# Patient Record
Sex: MALE | Race: WHITE | NOT HISPANIC OR LATINO | Employment: OTHER | URBAN - METROPOLITAN AREA
[De-identification: names, ages, dates, MRNs, and addresses within clinical notes are randomized per-mention and may not be internally consistent; named-entity substitution may affect disease eponyms.]

---

## 2021-12-14 ENCOUNTER — PRE-ADMISSION TESTING (OUTPATIENT)
Dept: PREADMISSION TESTING | Facility: HOSPITAL | Age: 61
End: 2021-12-14

## 2021-12-14 VITALS
WEIGHT: 225.6 LBS | RESPIRATION RATE: 16 BRPM | OXYGEN SATURATION: 99 % | TEMPERATURE: 98.8 F | HEART RATE: 77 BPM | DIASTOLIC BLOOD PRESSURE: 71 MMHG | SYSTOLIC BLOOD PRESSURE: 126 MMHG | HEIGHT: 67 IN | BODY MASS INDEX: 35.41 KG/M2

## 2021-12-14 LAB
ANION GAP SERPL CALCULATED.3IONS-SCNC: 12.2 MMOL/L (ref 5–15)
BUN SERPL-MCNC: 12 MG/DL (ref 8–23)
BUN/CREAT SERPL: 11.9 (ref 7–25)
CALCIUM SPEC-SCNC: 9.5 MG/DL (ref 8.6–10.5)
CHLORIDE SERPL-SCNC: 102 MMOL/L (ref 98–107)
CO2 SERPL-SCNC: 26.8 MMOL/L (ref 22–29)
CREAT SERPL-MCNC: 1.01 MG/DL (ref 0.76–1.27)
DEPRECATED RDW RBC AUTO: 45.2 FL (ref 37–54)
ERYTHROCYTE [DISTWIDTH] IN BLOOD BY AUTOMATED COUNT: 12.8 % (ref 12.3–15.4)
GFR SERPL CREATININE-BSD FRML MDRD: 75 ML/MIN/1.73
GLUCOSE SERPL-MCNC: 132 MG/DL (ref 65–99)
HCT VFR BLD AUTO: 43.7 % (ref 37.5–51)
HGB BLD-MCNC: 14.4 G/DL (ref 13–17.7)
MCH RBC QN AUTO: 31.6 PG (ref 26.6–33)
MCHC RBC AUTO-ENTMCNC: 33 G/DL (ref 31.5–35.7)
MCV RBC AUTO: 95.8 FL (ref 79–97)
PLATELET # BLD AUTO: 241 10*3/MM3 (ref 140–450)
PMV BLD AUTO: 10.2 FL (ref 6–12)
POTASSIUM SERPL-SCNC: 4 MMOL/L (ref 3.5–5.2)
QT INTERVAL: 405 MS
RBC # BLD AUTO: 4.56 10*6/MM3 (ref 4.14–5.8)
SODIUM SERPL-SCNC: 141 MMOL/L (ref 136–145)
WBC NRBC COR # BLD: 8.89 10*3/MM3 (ref 3.4–10.8)

## 2021-12-14 PROCEDURE — 80048 BASIC METABOLIC PNL TOTAL CA: CPT

## 2021-12-14 PROCEDURE — 36415 COLL VENOUS BLD VENIPUNCTURE: CPT

## 2021-12-14 PROCEDURE — 85027 COMPLETE CBC AUTOMATED: CPT

## 2021-12-14 PROCEDURE — 93005 ELECTROCARDIOGRAM TRACING: CPT

## 2021-12-14 PROCEDURE — 93010 ELECTROCARDIOGRAM REPORT: CPT | Performed by: INTERNAL MEDICINE

## 2021-12-14 RX ORDER — ATORVASTATIN CALCIUM 80 MG/1
80 TABLET, FILM COATED ORAL DAILY
COMMUNITY

## 2021-12-14 RX ORDER — AMLODIPINE BESYLATE 5 MG/1
TABLET ORAL DAILY
COMMUNITY

## 2021-12-14 RX ORDER — CHLORAL HYDRATE 500 MG
1000 CAPSULE ORAL DAILY
COMMUNITY
End: 2021-12-21 | Stop reason: HOSPADM

## 2021-12-14 RX ORDER — LATANOPROST 50 UG/ML
1 SOLUTION/ DROPS OPHTHALMIC
COMMUNITY

## 2021-12-14 RX ORDER — LEVETIRACETAM 750 MG/1
1500 TABLET ORAL 2 TIMES DAILY
COMMUNITY

## 2021-12-14 RX ORDER — MELOXICAM 15 MG/1
15 TABLET ORAL DAILY
COMMUNITY

## 2021-12-14 RX ORDER — ASPIRIN 81 MG/1
81 TABLET ORAL DAILY
COMMUNITY
End: 2021-12-21 | Stop reason: HOSPADM

## 2021-12-14 RX ORDER — PANTOPRAZOLE SODIUM 40 MG/1
40 TABLET, DELAYED RELEASE ORAL
COMMUNITY

## 2021-12-14 RX ORDER — LISINOPRIL 20 MG/1
20 TABLET ORAL DAILY
COMMUNITY

## 2021-12-14 RX ORDER — GABAPENTIN 400 MG/1
1200 CAPSULE ORAL 3 TIMES DAILY
COMMUNITY

## 2021-12-14 RX ORDER — BUDESONIDE AND FORMOTEROL FUMARATE DIHYDRATE 160; 4.5 UG/1; UG/1
2 AEROSOL RESPIRATORY (INHALATION)
COMMUNITY

## 2021-12-14 RX ORDER — CYCLOBENZAPRINE HCL 10 MG
10 TABLET ORAL 2 TIMES DAILY PRN
COMMUNITY

## 2021-12-14 NOTE — DISCHARGE INSTRUCTIONS
Take the following medications the morning of surgery: AMLODIPINE, GABAPENTIN, KEPPRA, PROTONIX    ARRIVAL TIME : 530AM    CUT OFF: 430AM    General Instructions:  • Do not eat solid food after midnight the night before surgery.  • You may drink clear liquids day of surgery but must stop at least one hour before your hospital arrival time.  • It is beneficial for you to have a clear drink that contains carbohydrates the day of surgery.  We suggest a 12 to 20 ounce bottle of Gatorade or Powerade for non-diabetic patients or a 12 to 20 ounce bottle of G2 or Powerade Zero for diabetic patients. (Pediatric patients, are not advised to drink a 12 to 20 ounce carbohydrate drink)    Clear liquids are liquids you can see through.  Nothing red in color.     Plain water                               Sports drinks  Sodas                                   Gelatin (Jell-O)  Fruit juices without pulp such as white grape juice and apple juice  Popsicles that contain no fruit or yogurt  Tea or coffee (no cream or milk added)  Gatorade / Powerade  G2 / Powerade Zero    • Patients who avoid smoking, chewing tobacco and alcohol for 4 weeks prior to surgery have a reduced risk of post-operative complications.  Quit smoking as many days before surgery as you can.  • Do not smoke, use chewing tobacco or drink alcohol the day of surgery.   • Bring any papers given to you in the doctor’s office.  • Wear clean comfortable clothes.  • Do not wear contact lenses, false eyelashes or make-up.  Bring a case for your glasses.   • Bring crutches or walker if applicable.  • Remove all piercings.  Leave jewelry and any other valuables at home.  • Hair extensions with metal clips must be removed prior to surgery.  • The Pre-Admission Testing nurse will instruct you to bring medications if unable to obtain an accurate list in Pre-Admission Testing.        Preventing a Surgical Site Infection:  • For 2 to 3 days before surgery, avoid shaving with a  razor because the razor can irritate skin and make it easier to develop an infection.    • Any areas of open skin can increase the risk of a post-operative wound infection by allowing bacteria to enter and travel throughout the body.  Notify your surgeon if you have any skin wounds / rashes even if it is not near the expected surgical site.  The area will need assessed to determine if surgery should be delayed until it is healed.  • The night prior to surgery shower using a fresh bar of anti-bacterial soap (such as Dial) and clean washcloth.  Sleep in a clean bed with clean clothing.  Do not allow pets to sleep with you.  • Shower on the morning of surgery using a fresh bar of anti-bacterial soap (such as Dial) and clean washcloth.  Dry with a clean towel and dress in clean clothing.  • Ask your surgeon if you will be receiving antibiotics prior to surgery.  • Make sure you, your family, and all healthcare providers clean their hands with soap and water or an alcohol based hand  before caring for you or your wound.    Day of surgery:  Your arrival time is approximately two hours before your scheduled surgery time.  Upon arrival, a Pre-op nurse and Anesthesiologist will review your health history, obtain vital signs, and answer questions you may have.  The only belongings needed at this time will be a list of your home medications and if applicable your C-PAP/BI-PAP machine.  A Pre-op nurse will start an IV and you may receive medication in preparation for surgery, including something to help you relax.     Please be aware that surgery does come with discomfort.  We want to make every effort to control your discomfort so please discuss any uncontrolled symptoms with your nurse.   Your doctor will most likely have prescribed pain medications.      If you are going home after surgery you will receive individualized written care instructions before being discharged.  A responsible adult must drive you to and  from the hospital on the day of your surgery and stay with you for 24 hours.  Discharge prescriptions can be filled by the hospital pharmacy during regular pharmacy hours.  If you are having surgery late in the day/evening your prescription may be e-prescribed to your pharmacy.  Please verify your pharmacy hours or chose a 24 hour pharmacy to avoid not having access to your prescription because your pharmacy has closed for the day.    If you are staying overnight following surgery, you will be transported to your hospital room following the recovery period.  [facility] has all private rooms.    If you have any questions please call Pre-Admission Testing at [phone].  Deductibles and co-payments are collected on the day of service. Please be prepared to pay the required co-pay, deductible or deposit on the day of service as defined by your plan.    Patient Education for Self-Quarantine Process    • Following your COVID testing, we strongly recommend that you wear a mask when you are with other people and practice social distancing.   • Limit your activities to only required outings.  • Wash your hands with soap and water frequently for at least 20 seconds.   • Avoid touching your eyes, nose and mouth with unwashed hands.  • Do not share anything - utensils, drinking glasses, food from the same bowl.   • Sanitize household surfaces daily. Include all high touch areas (door handles, light switches, phones, countertops, etc.)    Call your surgeon immediately if you experience any of the following symptoms:  • Sore Throat  • Shortness of Breath or difficulty breathing  • Cough  • Chills  • Body soreness or muscle pain  • Headache  • Fever  • New loss of taste or smell  • Do not arrive for your surgery ill.  Your procedure will need to be rescheduled to another time.  You will need to call your physician before the day of surgery to avoid any unnecessary exposure to hospital staff as well as other  patients.

## 2021-12-18 ENCOUNTER — LAB (OUTPATIENT)
Dept: LAB | Facility: HOSPITAL | Age: 61
End: 2021-12-18

## 2021-12-18 LAB — SARS-COV-2 ORF1AB RESP QL NAA+PROBE: NOT DETECTED

## 2021-12-18 PROCEDURE — C9803 HOPD COVID-19 SPEC COLLECT: HCPCS

## 2021-12-18 PROCEDURE — U0004 COV-19 TEST NON-CDC HGH THRU: HCPCS

## 2021-12-20 ENCOUNTER — ANESTHESIA EVENT (OUTPATIENT)
Dept: PERIOP | Facility: HOSPITAL | Age: 61
End: 2021-12-20

## 2021-12-21 ENCOUNTER — HOSPITAL ENCOUNTER (OUTPATIENT)
Facility: HOSPITAL | Age: 61
Setting detail: HOSPITAL OUTPATIENT SURGERY
Discharge: HOME | End: 2021-12-21
Attending: OPHTHALMOLOGY | Admitting: OPHTHALMOLOGY

## 2021-12-21 ENCOUNTER — ANESTHESIA (OUTPATIENT)
Dept: PERIOP | Facility: HOSPITAL | Age: 61
End: 2021-12-21

## 2021-12-21 VITALS
SYSTOLIC BLOOD PRESSURE: 81 MMHG | DIASTOLIC BLOOD PRESSURE: 57 MMHG | RESPIRATION RATE: 16 BRPM | OXYGEN SATURATION: 92 % | TEMPERATURE: 99.1 F | HEART RATE: 60 BPM

## 2021-12-21 DIAGNOSIS — C44.91 BASAL CELL CARCINOMA: ICD-10-CM

## 2021-12-21 PROCEDURE — 25010000002 ONDANSETRON PER 1 MG: Performed by: NURSE ANESTHETIST, CERTIFIED REGISTERED

## 2021-12-21 PROCEDURE — 25010000002 MIDAZOLAM PER 1 MG: Performed by: STUDENT IN AN ORGANIZED HEALTH CARE EDUCATION/TRAINING PROGRAM

## 2021-12-21 PROCEDURE — 25010000002 FENTANYL CITRATE (PF) 50 MCG/ML SOLUTION: Performed by: NURSE ANESTHETIST, CERTIFIED REGISTERED

## 2021-12-21 PROCEDURE — 25010000002 PROPOFOL 10 MG/ML EMULSION: Performed by: NURSE ANESTHETIST, CERTIFIED REGISTERED

## 2021-12-21 PROCEDURE — 25010000002 PHENYLEPHRINE 10 MG/ML SOLUTION: Performed by: NURSE ANESTHETIST, CERTIFIED REGISTERED

## 2021-12-21 PROCEDURE — 94799 UNLISTED PULMONARY SVC/PX: CPT

## 2021-12-21 PROCEDURE — 88331 PATH CONSLTJ SURG 1 BLK 1SPC: CPT | Performed by: OPHTHALMOLOGY

## 2021-12-21 PROCEDURE — 88305 TISSUE EXAM BY PATHOLOGIST: CPT | Performed by: OPHTHALMOLOGY

## 2021-12-21 RX ORDER — MIDAZOLAM HYDROCHLORIDE 1 MG/ML
1 INJECTION INTRAMUSCULAR; INTRAVENOUS
Status: DISCONTINUED | OUTPATIENT
Start: 2021-12-21 | End: 2021-12-21 | Stop reason: HOSPADM

## 2021-12-21 RX ORDER — MAGNESIUM HYDROXIDE 1200 MG/15ML
LIQUID ORAL AS NEEDED
Status: DISCONTINUED | OUTPATIENT
Start: 2021-12-21 | End: 2021-12-21 | Stop reason: HOSPADM

## 2021-12-21 RX ORDER — PROMETHAZINE HYDROCHLORIDE 25 MG/1
25 TABLET ORAL ONCE AS NEEDED
Status: DISCONTINUED | OUTPATIENT
Start: 2021-12-21 | End: 2021-12-21 | Stop reason: HOSPADM

## 2021-12-21 RX ORDER — LIDOCAINE HYDROCHLORIDE 10 MG/ML
0.5 INJECTION, SOLUTION EPIDURAL; INFILTRATION; INTRACAUDAL; PERINEURAL ONCE AS NEEDED
Status: COMPLETED | OUTPATIENT
Start: 2021-12-21 | End: 2021-12-21

## 2021-12-21 RX ORDER — OXYCODONE AND ACETAMINOPHEN 7.5; 325 MG/1; MG/1
1 TABLET ORAL EVERY 4 HOURS PRN
Status: DISCONTINUED | OUTPATIENT
Start: 2021-12-21 | End: 2021-12-21 | Stop reason: HOSPADM

## 2021-12-21 RX ORDER — HYDRALAZINE HYDROCHLORIDE 20 MG/ML
5 INJECTION INTRAMUSCULAR; INTRAVENOUS
Status: DISCONTINUED | OUTPATIENT
Start: 2021-12-21 | End: 2021-12-21 | Stop reason: HOSPADM

## 2021-12-21 RX ORDER — PHENYLEPHRINE HYDROCHLORIDE 10 MG/ML
INJECTION INTRAVENOUS AS NEEDED
Status: DISCONTINUED | OUTPATIENT
Start: 2021-12-21 | End: 2021-12-21 | Stop reason: SURG

## 2021-12-21 RX ORDER — SODIUM CHLORIDE 0.9 % (FLUSH) 0.9 %
3-10 SYRINGE (ML) INJECTION AS NEEDED
Status: DISCONTINUED | OUTPATIENT
Start: 2021-12-21 | End: 2021-12-21 | Stop reason: HOSPADM

## 2021-12-21 RX ORDER — ERYTHROMYCIN 5 MG/G
OINTMENT OPHTHALMIC
Qty: 3.5 G | Refills: 1 | Status: ON HOLD | OUTPATIENT
Start: 2021-12-21 | End: 2022-04-28

## 2021-12-21 RX ORDER — HYDROCODONE BITARTRATE AND ACETAMINOPHEN 5; 325 MG/1; MG/1
1 TABLET ORAL EVERY 6 HOURS PRN
Qty: 15 TABLET | Refills: 0 | Status: ON HOLD | OUTPATIENT
Start: 2021-12-21 | End: 2022-04-28

## 2021-12-21 RX ORDER — DIPHENHYDRAMINE HCL 25 MG
25 CAPSULE ORAL
Status: DISCONTINUED | OUTPATIENT
Start: 2021-12-21 | End: 2021-12-21 | Stop reason: HOSPADM

## 2021-12-21 RX ORDER — ACETAMINOPHEN 650 MG/1
650 SUPPOSITORY RECTAL ONCE AS NEEDED
Status: DISCONTINUED | OUTPATIENT
Start: 2021-12-21 | End: 2021-12-21 | Stop reason: HOSPADM

## 2021-12-21 RX ORDER — SODIUM CHLORIDE 0.9 % (FLUSH) 0.9 %
3 SYRINGE (ML) INJECTION EVERY 12 HOURS SCHEDULED
Status: DISCONTINUED | OUTPATIENT
Start: 2021-12-21 | End: 2021-12-21 | Stop reason: HOSPADM

## 2021-12-21 RX ORDER — EPHEDRINE SULFATE 50 MG/ML
INJECTION, SOLUTION INTRAVENOUS AS NEEDED
Status: DISCONTINUED | OUTPATIENT
Start: 2021-12-21 | End: 2021-12-21 | Stop reason: SURG

## 2021-12-21 RX ORDER — HYDROMORPHONE HYDROCHLORIDE 1 MG/ML
0.5 INJECTION, SOLUTION INTRAMUSCULAR; INTRAVENOUS; SUBCUTANEOUS
Status: DISCONTINUED | OUTPATIENT
Start: 2021-12-21 | End: 2021-12-21 | Stop reason: HOSPADM

## 2021-12-21 RX ORDER — OXYMETAZOLINE HYDROCHLORIDE 0.05 G/100ML
SPRAY NASAL AS NEEDED
Status: DISCONTINUED | OUTPATIENT
Start: 2021-12-21 | End: 2021-12-21 | Stop reason: HOSPADM

## 2021-12-21 RX ORDER — HYDROCODONE BITARTRATE AND ACETAMINOPHEN 7.5; 325 MG/1; MG/1
1 TABLET ORAL ONCE AS NEEDED
Status: COMPLETED | OUTPATIENT
Start: 2021-12-21 | End: 2021-12-21

## 2021-12-21 RX ORDER — ACETAMINOPHEN 325 MG/1
650 TABLET ORAL ONCE AS NEEDED
Status: DISCONTINUED | OUTPATIENT
Start: 2021-12-21 | End: 2021-12-21 | Stop reason: HOSPADM

## 2021-12-21 RX ORDER — FENTANYL CITRATE 50 UG/ML
INJECTION, SOLUTION INTRAMUSCULAR; INTRAVENOUS AS NEEDED
Status: DISCONTINUED | OUTPATIENT
Start: 2021-12-21 | End: 2021-12-21 | Stop reason: SURG

## 2021-12-21 RX ORDER — ERYTHROMYCIN 5 MG/G
OINTMENT OPHTHALMIC AS NEEDED
Status: DISCONTINUED | OUTPATIENT
Start: 2021-12-21 | End: 2021-12-21 | Stop reason: HOSPADM

## 2021-12-21 RX ORDER — NALOXONE HCL 0.4 MG/ML
0.2 VIAL (ML) INJECTION AS NEEDED
Status: DISCONTINUED | OUTPATIENT
Start: 2021-12-21 | End: 2021-12-21 | Stop reason: HOSPADM

## 2021-12-21 RX ORDER — PROPOFOL 10 MG/ML
VIAL (ML) INTRAVENOUS AS NEEDED
Status: DISCONTINUED | OUTPATIENT
Start: 2021-12-21 | End: 2021-12-21 | Stop reason: SURG

## 2021-12-21 RX ORDER — DIPHENHYDRAMINE HYDROCHLORIDE 50 MG/ML
12.5 INJECTION INTRAMUSCULAR; INTRAVENOUS
Status: DISCONTINUED | OUTPATIENT
Start: 2021-12-21 | End: 2021-12-21 | Stop reason: HOSPADM

## 2021-12-21 RX ORDER — LIDOCAINE HYDROCHLORIDE 20 MG/ML
INJECTION, SOLUTION INFILTRATION; PERINEURAL AS NEEDED
Status: DISCONTINUED | OUTPATIENT
Start: 2021-12-21 | End: 2021-12-21 | Stop reason: SURG

## 2021-12-21 RX ORDER — GLYCOPYRROLATE 0.2 MG/ML
INJECTION INTRAMUSCULAR; INTRAVENOUS AS NEEDED
Status: DISCONTINUED | OUTPATIENT
Start: 2021-12-21 | End: 2021-12-21 | Stop reason: SURG

## 2021-12-21 RX ORDER — PROMETHAZINE HYDROCHLORIDE 25 MG/1
25 SUPPOSITORY RECTAL ONCE AS NEEDED
Status: DISCONTINUED | OUTPATIENT
Start: 2021-12-21 | End: 2021-12-21 | Stop reason: HOSPADM

## 2021-12-21 RX ORDER — FLUMAZENIL 0.1 MG/ML
0.2 INJECTION INTRAVENOUS AS NEEDED
Status: DISCONTINUED | OUTPATIENT
Start: 2021-12-21 | End: 2021-12-21 | Stop reason: HOSPADM

## 2021-12-21 RX ORDER — ACETAMINOPHEN 500 MG
500 TABLET ORAL ONCE
Status: COMPLETED | OUTPATIENT
Start: 2021-12-21 | End: 2021-12-21

## 2021-12-21 RX ORDER — ONDANSETRON 2 MG/ML
4 INJECTION INTRAMUSCULAR; INTRAVENOUS ONCE AS NEEDED
Status: DISCONTINUED | OUTPATIENT
Start: 2021-12-21 | End: 2021-12-21 | Stop reason: HOSPADM

## 2021-12-21 RX ORDER — ONDANSETRON 2 MG/ML
INJECTION INTRAMUSCULAR; INTRAVENOUS AS NEEDED
Status: DISCONTINUED | OUTPATIENT
Start: 2021-12-21 | End: 2021-12-21 | Stop reason: SURG

## 2021-12-21 RX ORDER — LABETALOL HYDROCHLORIDE 5 MG/ML
5 INJECTION, SOLUTION INTRAVENOUS
Status: DISCONTINUED | OUTPATIENT
Start: 2021-12-21 | End: 2021-12-21 | Stop reason: HOSPADM

## 2021-12-21 RX ORDER — SODIUM CHLORIDE, SODIUM LACTATE, POTASSIUM CHLORIDE, CALCIUM CHLORIDE 600; 310; 30; 20 MG/100ML; MG/100ML; MG/100ML; MG/100ML
9 INJECTION, SOLUTION INTRAVENOUS CONTINUOUS
Status: DISCONTINUED | OUTPATIENT
Start: 2021-12-21 | End: 2021-12-21 | Stop reason: HOSPADM

## 2021-12-21 RX ORDER — EPHEDRINE SULFATE 50 MG/ML
5 INJECTION, SOLUTION INTRAVENOUS ONCE AS NEEDED
Status: DISCONTINUED | OUTPATIENT
Start: 2021-12-21 | End: 2021-12-21 | Stop reason: HOSPADM

## 2021-12-21 RX ORDER — FENTANYL CITRATE 50 UG/ML
50 INJECTION, SOLUTION INTRAMUSCULAR; INTRAVENOUS
Status: DISCONTINUED | OUTPATIENT
Start: 2021-12-21 | End: 2021-12-21 | Stop reason: HOSPADM

## 2021-12-21 RX ADMIN — LIDOCAINE HYDROCHLORIDE 100 MG: 20 INJECTION, SOLUTION INFILTRATION; PERINEURAL at 06:56

## 2021-12-21 RX ADMIN — SODIUM CHLORIDE, POTASSIUM CHLORIDE, SODIUM LACTATE AND CALCIUM CHLORIDE 9 ML/HR: 600; 310; 30; 20 INJECTION, SOLUTION INTRAVENOUS at 06:25

## 2021-12-21 RX ADMIN — LIDOCAINE HYDROCHLORIDE 0.5 ML: 10 INJECTION, SOLUTION EPIDURAL; INFILTRATION; INTRACAUDAL; PERINEURAL at 06:25

## 2021-12-21 RX ADMIN — FENTANYL CITRATE 25 MCG: 50 INJECTION INTRAMUSCULAR; INTRAVENOUS at 09:05

## 2021-12-21 RX ADMIN — PHENYLEPHRINE HYDROCHLORIDE 200 MCG: 10 INJECTION, SOLUTION INTRAVENOUS at 08:42

## 2021-12-21 RX ADMIN — PROPOFOL 180 MG: 10 INJECTION, EMULSION INTRAVENOUS at 06:56

## 2021-12-21 RX ADMIN — EPHEDRINE SULFATE 10 MG: 50 INJECTION INTRAVENOUS at 07:10

## 2021-12-21 RX ADMIN — PHENYLEPHRINE HYDROCHLORIDE 200 MCG: 10 INJECTION, SOLUTION INTRAVENOUS at 07:28

## 2021-12-21 RX ADMIN — FENTANYL CITRATE 25 MCG: 50 INJECTION INTRAMUSCULAR; INTRAVENOUS at 07:07

## 2021-12-21 RX ADMIN — ONDANSETRON 4 MG: 2 INJECTION INTRAMUSCULAR; INTRAVENOUS at 08:34

## 2021-12-21 RX ADMIN — PROPOFOL 20 MG: 10 INJECTION, EMULSION INTRAVENOUS at 07:01

## 2021-12-21 RX ADMIN — PHENYLEPHRINE HYDROCHLORIDE 100 MCG: 10 INJECTION, SOLUTION INTRAVENOUS at 07:33

## 2021-12-21 RX ADMIN — PHENYLEPHRINE HYDROCHLORIDE 200 MCG: 10 INJECTION, SOLUTION INTRAVENOUS at 07:18

## 2021-12-21 RX ADMIN — EPHEDRINE SULFATE 10 MG: 50 INJECTION INTRAVENOUS at 07:13

## 2021-12-21 RX ADMIN — EPHEDRINE SULFATE 5 MG: 50 INJECTION INTRAVENOUS at 07:28

## 2021-12-21 RX ADMIN — SODIUM CHLORIDE, POTASSIUM CHLORIDE, SODIUM LACTATE AND CALCIUM CHLORIDE: 600; 310; 30; 20 INJECTION, SOLUTION INTRAVENOUS at 08:53

## 2021-12-21 RX ADMIN — ACETAMINOPHEN 500 MG: 500 TABLET ORAL at 06:25

## 2021-12-21 RX ADMIN — HYDROCODONE BITARTRATE AND ACETAMINOPHEN 1 TABLET: 7.5; 325 TABLET ORAL at 09:38

## 2021-12-21 RX ADMIN — PHENYLEPHRINE HYDROCHLORIDE 200 MCG: 10 INJECTION, SOLUTION INTRAVENOUS at 07:25

## 2021-12-21 RX ADMIN — EPHEDRINE SULFATE 5 MG: 50 INJECTION INTRAVENOUS at 07:18

## 2021-12-21 RX ADMIN — PHENYLEPHRINE HYDROCHLORIDE 250 MCG: 10 INJECTION, SOLUTION INTRAVENOUS at 07:50

## 2021-12-21 RX ADMIN — PHENYLEPHRINE HYDROCHLORIDE 100 MCG: 10 INJECTION, SOLUTION INTRAVENOUS at 07:59

## 2021-12-21 RX ADMIN — PHENYLEPHRINE HYDROCHLORIDE 200 MCG: 10 INJECTION, SOLUTION INTRAVENOUS at 08:18

## 2021-12-21 RX ADMIN — EPHEDRINE SULFATE 5 MG: 50 INJECTION INTRAVENOUS at 07:25

## 2021-12-21 RX ADMIN — PHENYLEPHRINE HYDROCHLORIDE 100 MCG: 10 INJECTION, SOLUTION INTRAVENOUS at 08:03

## 2021-12-21 RX ADMIN — FENTANYL CITRATE 25 MCG: 50 INJECTION INTRAMUSCULAR; INTRAVENOUS at 07:33

## 2021-12-21 RX ADMIN — EPHEDRINE SULFATE 10 MG: 50 INJECTION INTRAVENOUS at 07:05

## 2021-12-21 RX ADMIN — FENTANYL CITRATE 25 MCG: 50 INJECTION INTRAMUSCULAR; INTRAVENOUS at 07:11

## 2021-12-21 RX ADMIN — PHENYLEPHRINE HYDROCHLORIDE 100 MCG: 10 INJECTION, SOLUTION INTRAVENOUS at 07:13

## 2021-12-21 RX ADMIN — PHENYLEPHRINE HYDROCHLORIDE 100 MCG: 10 INJECTION, SOLUTION INTRAVENOUS at 08:26

## 2021-12-21 RX ADMIN — GLYCOPYRROLATE 0.2 MG: 0.2 INJECTION INTRAMUSCULAR; INTRAVENOUS at 07:28

## 2021-12-21 RX ADMIN — PHENYLEPHRINE HYDROCHLORIDE 150 MCG: 10 INJECTION, SOLUTION INTRAVENOUS at 08:49

## 2021-12-21 RX ADMIN — MIDAZOLAM 1 MG: 1 INJECTION INTRAMUSCULAR; INTRAVENOUS at 06:25

## 2021-12-21 RX ADMIN — PHENYLEPHRINE HYDROCHLORIDE 100 MCG: 10 INJECTION, SOLUTION INTRAVENOUS at 08:55

## 2021-12-21 RX ADMIN — PHENYLEPHRINE HYDROCHLORIDE 200 MCG: 10 INJECTION, SOLUTION INTRAVENOUS at 07:42

## 2021-12-21 RX ADMIN — FENTANYL CITRATE 50 MCG: 50 INJECTION INTRAMUSCULAR; INTRAVENOUS at 09:37

## 2021-12-21 RX ADMIN — PHENYLEPHRINE HYDROCHLORIDE 100 MCG: 10 INJECTION, SOLUTION INTRAVENOUS at 08:06

## 2021-12-21 NOTE — ANESTHESIA PREPROCEDURE EVALUATION
Anesthesia Evaluation     Patient summary reviewed and Nursing notes reviewed   no history of anesthetic complications:  NPO Solid Status: > 8 hours  NPO Liquid Status: > 2 hours           Airway   Mallampati: II  TM distance: >3 FB  Neck ROM: full  Dental    (+) upper dentures    Pulmonary    (+) a smoker Current, COPD,   Cardiovascular     ECG reviewed    (+) hypertension, CAD, cardiac stents hyperlipidemia,       Neuro/Psych  (+) seizures, CVA,       ROS Comment: Hx of TBI  GI/Hepatic/Renal/Endo    (+) obesity,  GERD,      Musculoskeletal     Abdominal    Substance History      OB/GYN          Other                      Anesthesia Plan    ASA 3     general     intravenous induction     Anesthetic plan, all risks, benefits, and alternatives have been provided, discussed and informed consent has been obtained with: patient.

## 2021-12-21 NOTE — ANESTHESIA PROCEDURE NOTES
Airway  Urgency: elective    Date/Time: 12/21/2021 6:57 AM    General Information and Staff    Patient location during procedure: OR  Anesthesiologist: Celestine Teran MD  CRNA: Lisa Teague CRNA    Indications and Patient Condition  Indications for airway management: airway protection    Preoxygenated: yes  Mask difficulty assessment: 1 - vent by mask    Final Airway Details  Final airway type: supraglottic airway      Successful airway: unique  Size 5    Number of attempts at approach: 1  Assessment: lips, teeth, and gum same as pre-op and atraumatic intubation

## 2021-12-21 NOTE — OP NOTE
OPERATIVE NOTE    Patient Identification:  Name: Man Joseph  Age: 61 y.o.  Sex: male  :  1960  MRN: [id number]                                                 Preoperative diagnosis: Right medial canthal basal cell carcinoma  Postoperative diagnosis: same  Procedure: Excision of right  Basal cell carcinoma at medial canthus with frozen sections and repair with rotational flap, and probing of canalicular system on the right.   Surgeon: Joshua Clemens MD who was present and scrubbed throughout all critical portions of the operation  Assistants: Jodie Veloz MD ; Srinivas Lerner MD   Anesthesia: General  EBL: less than 50cc  Specimens:   ID Type Source Tests Collected by Time   A : RIGHT MEDIAL CANTHUS LESION WHITE AT 1200 AND BLACK  FROZEN CALL TO 4273 Tissue Eye, Right TISSUE PATHOLOGY EXAM Joshua Clemens MD 2021 0720   B : RIGHT MEDIAL CANTHUS LESION AT NEW EDGE BLACK SUTURE AT 1200 FROZEN CALL TO 4273 Tissue Eye, Right TISSUE PATHOLOGY EXAM Joshua Clemens MD 2021 0752          Description of the procedure:   The patient was taken to the operating room and placed on the table in the supine position, where anesthesia was induced. 2% lidocaine with epinephrine and 0.5% marcaine in a 1:1 fashion was injected over the surgical site, and the patient was prepped and draped in the usual manner for orbitofacial surgery.      Corneal protectors were placed in both eyes.      The right medial canthal lesion which was found to be ~1.2x1.3cm was excised with a #15 Bard Malcolm blade with a 1-2mm margin. The lesion was marked with two sutures at 6 and 12 oclock to orient the lesion. It was sent for frozen section to the pathology lab. The frozen section returned as basal cell carcinoma with positive 12 o'clock margin. An additional 2mm crescent at the superior margin was excised and oriented with a marking stitch and sent for frozen section. Margins were negative.     The  resulting defect measured 2x1.6cm. The lacrimal system was probed with Estrada probes to assess for involvement of the canaliculi. The probe could not be visualized and the system was determined to be intact.     To repair the  defect, a gabellar rotational flap was incised with a 15 blade. The flap was undermined with iris scissors, and rotated inferiorly so that the glabellar triangle filled the entirety of the defect.  The flap was trimmed to the appropriate size and  the central portion of the flap was sutured to the underlying tissue bed with buried 4-0 vicryl suture.   The remainder of the flap was sutured in place with 5-0 vicryl suture deeply and 5-0 fast absorbing suture superficially. A relaxing incision was made on the right upper lid to allow re approximation of the glabellar defect.  The glabellar defect was closed with deep 5-0 Vicryl sutures in a buried fashion. The skin of the glabella was closed with 5-0 Prolene sutures, first in a vertical mattress fashion to provide wound eversion, followed by an overlying running stitch.  The lid crease incision was closed with 5-0 fast-absorbing gut suture.       The corneal protectors were removed and antibiotic ophthalmic ointment was placed over the surgical site.      The patient was then awakened and taken from the operating room in good condition, having tolerated the procedure well. There were no complications, and the estimated blood loss was less than 50 cc.

## 2021-12-21 NOTE — ADDENDUM NOTE
Addendum  created 12/21/21 1139 by Celestine Teran MD    Attestation recorded in Intraprocedure, Intraprocedure Attestations filed

## 2021-12-21 NOTE — ANESTHESIA POSTPROCEDURE EVALUATION
Patient: Man Joseph    Procedure Summary     Date: 12/21/21 Room / Location:  [facility]  /  [facility]    Anesthesia Start: 0651 Anesthesia Stop: 0915    Procedures:       RIGHT EXCISION OF BASAL CELL CARCINOMA WITH FROZEN SECTION (Right Eye)      RIGHT MEDIAL CANTHUS REPAIR WITH ROTATIONAL FLAP PROBING OF RIGHT TEAR DUCT (Right Head) Diagnosis:     Surgeons: Joshua Clemens MD Provider: Celestine Teran MD    Anesthesia Type: general ASA Status: 3          Anesthesia Type: general    Vitals  Vitals Value Taken Time   BP 89/63 12/21/21 1016   Temp 37.3 °C (99.1 °F) 12/21/21 1015   Pulse 65 12/21/21 1018   Resp 16 12/21/21 1015   SpO2 97 % 12/21/21 1018   Vitals shown include unvalidated device data.        Post Anesthesia Care and Evaluation    Patient location during evaluation: bedside  Patient participation: complete - patient participated  Level of consciousness: awake and alert  Pain management: adequate  Airway patency: patent  Anesthetic complications: No anesthetic complications    Cardiovascular status: acceptable  Respiratory status: acceptable  Hydration status: acceptable    Comments: BP (!) 89/63 (BP Location: Right arm, Patient Position: Lying)   Pulse 66   Temp 37.3 °C (99.1 °F) (Temporal)   Resp 16   SpO2 97%

## 2021-12-21 NOTE — H&P
" History & Physical       Patient: Man Joseph    Date of Admission: 12/21/2021  5:27 AM    YOB: 1960    Medical Record Number: [id number]      Chief Complaints: lesion of right medial canthus      History of Present Illness: 61 y.o. male presents with as above. No new meds/health problems since office visit      Allergies:   Allergies   Allergen Reactions   • Penicillins Rash       10 point review of systems negative, except pertaining to the HPI    Medications:   Home Medications:  No current facility-administered medications on file prior to encounter.     No current outpatient medications on file prior to encounter.     Current Medications:  Scheduled Meds:sodium chloride, 3 mL, Intravenous, Q12H      Continuous Infusions:lactated ringers, 9 mL/hr, Last Rate: 9 mL/hr (12/21/21 0651)      PRN Meds:.•  acetaminophen **OR** acetaminophen  •  diphenhydrAMINE  •  diphenhydrAMINE  •  ePHEDrine  •  fentanyl  •  flumazenil  •  hydrALAZINE  •  HYDROcodone-acetaminophen  •  HYDROmorphone  •  labetalol  •  midazolam  •  naloxone  •  ondansetron  •  oxyCODONE-acetaminophen  •  promethazine **OR** promethazine  •  sodium chloride    Past Medical History:   Diagnosis Date   • Aortic aneurysm ([facility])     midline    • Basal cell carcinoma     NOSE/RT EYE-DUCT   • Cancer ([facility])     RT KIDNEY   • Cardiac arrest ([facility])     15YO    • COPD (chronic obstructive pulmonary disease) ([facility])    • GERD (gastroesophageal reflux disease)    • History of chest pain    • Hyperlipidemia    • Hypertension    • MI (myocardial infarction) ([facility])    • PTSD (post-traumatic stress disorder)    • Seizure ([facility])    • Stroke ([facility])     TIA-\"MINOR\"   • TBI (traumatic brain injury) ([facility])     AGE 24   • Tinnitus         Past Surgical History:   Procedure Laterality Date   • APPENDECTOMY      15YO   • CARPAL TUNNEL RELEASE Bilateral    • CERVICAL FUSION      C4-C5-C6   • CORONARY ANGIOPLASTY WITH STENT PLACEMENT     • HERNIA REPAIR Left     x2   • " KIDNEY SURGERY Right     PARTIAL NEPHRECTOMY   • SHOULDER ARTHROSCOPY Right    • SINUS SURGERY     • SKIN CANCER EXCISION  2019    BRIDGE OF NOSE    • TONSILLECTOMY      ADNOIDS        Social History     Occupational History   • Not on file   Tobacco Use   • Smoking status: Current Every Day Smoker     Packs/day: 1.00     Types: Cigarettes   • Smokeless tobacco: Never Used   • Tobacco comment: SINCE 12 YO   Vaping Use   • Vaping Use: Never used   Substance and Sexual Activity   • Alcohol use: Yes     Comment: RARE   • Drug use: Never   • Sexual activity: Defer      Social History     Social History Narrative   • Not on file        Family History   Problem Relation Age of Onset   • Malig Hyperthermia Neg Hx            Physical Exam   Constitutional: Alert, cooperative, in no acute distress    Head: Normocephalic.   Eyes:   Right medial canthal lesion, >1cm, nodular with ulceration  Neck: Normal range of motion.   Cardiovascular: Normal rate.    Pulmonary/Chest: Effort normal.   Neurological: Alert.   Skin: Skin is warm.   Psychiatric: Normal mood and affect.       Assessment/Plan:  The patient voiced understanding of the risks, benefits, and alternative forms of treatment that were discussed and the patient consents to proceed with excision of right medial canthal basal cell carcinoma with frozen sections and repair with rotational flap, possible full thickness skin graft, possible repair of canaliculus and tear duct.       Jodie Veloz MD

## 2021-12-22 LAB
LAB AP CASE REPORT: NORMAL
Lab: NORMAL
PATH REPORT.FINAL DX SPEC: NORMAL
PATH REPORT.GROSS SPEC: NORMAL

## 2022-04-27 ENCOUNTER — APPOINTMENT (OUTPATIENT)
Dept: CT IMAGING | Facility: HOSPITAL | Age: 62
End: 2022-04-27

## 2022-04-27 ENCOUNTER — APPOINTMENT (OUTPATIENT)
Dept: GENERAL RADIOLOGY | Facility: HOSPITAL | Age: 62
End: 2022-04-27

## 2022-04-27 ENCOUNTER — HOSPITAL ENCOUNTER (OUTPATIENT)
Facility: HOSPITAL | Age: 62
Setting detail: OBSERVATION
Discharge: HOME | End: 2022-04-28
Attending: EMERGENCY MEDICINE | Admitting: HOSPITALIST

## 2022-04-27 DIAGNOSIS — I25.10 CORONARY ARTERY DISEASE INVOLVING NATIVE HEART, UNSPECIFIED VESSEL OR LESION TYPE, UNSPECIFIED WHETHER ANGINA PRESENT: ICD-10-CM

## 2022-04-27 DIAGNOSIS — R07.9 CHEST PAIN, UNSPECIFIED TYPE: ICD-10-CM

## 2022-04-27 DIAGNOSIS — R42 ORTHOSTATIC LIGHTHEADEDNESS: ICD-10-CM

## 2022-04-27 DIAGNOSIS — I95.9 HYPOTENSION, UNSPECIFIED HYPOTENSION TYPE: Primary | ICD-10-CM

## 2022-04-27 PROBLEM — E87.5 HYPERKALEMIA: Status: ACTIVE | Noted: 2022-04-27

## 2022-04-27 PROBLEM — C44.311 BASAL CELL CARCINOMA (BCC) OF SKIN OF NOSE: Chronic | Status: ACTIVE | Noted: 2022-04-27

## 2022-04-27 PROBLEM — J44.9 COPD (CHRONIC OBSTRUCTIVE PULMONARY DISEASE): Chronic | Status: ACTIVE | Noted: 2022-04-27

## 2022-04-27 PROBLEM — I10 ESSENTIAL HYPERTENSION: Chronic | Status: ACTIVE | Noted: 2022-04-27

## 2022-04-27 PROBLEM — I25.9 CHEST PAIN DUE TO MYOCARDIAL ISCHEMIA: Chronic | Status: ACTIVE | Noted: 2022-04-27

## 2022-04-27 PROBLEM — Z86.73 HISTORY OF CVA (CEREBROVASCULAR ACCIDENT): Chronic | Status: ACTIVE | Noted: 2022-04-27

## 2022-04-27 PROBLEM — E78.2 MIXED HYPERLIPIDEMIA: Chronic | Status: ACTIVE | Noted: 2022-04-27

## 2022-04-27 PROBLEM — I71.40 ABDOMINAL AORTIC ANEURYSM (AAA) WITHOUT RUPTURE: Chronic | Status: ACTIVE | Noted: 2022-04-27

## 2022-04-27 PROBLEM — I46.9 CARDIAC ARREST: Status: ACTIVE | Noted: 2022-04-27

## 2022-04-27 PROBLEM — K21.9 GERD WITHOUT ESOPHAGITIS: Chronic | Status: ACTIVE | Noted: 2022-04-27

## 2022-04-27 PROBLEM — R56.9 SEIZURE (HCC): Chronic | Status: ACTIVE | Noted: 2022-04-27

## 2022-04-27 PROBLEM — N17.9 AKI (ACUTE KIDNEY INJURY): Status: ACTIVE | Noted: 2022-04-27

## 2022-04-27 LAB
ALBUMIN SERPL-MCNC: 4.1 G/DL (ref 3.5–5.2)
ALBUMIN/GLOB SERPL: 1.2 G/DL
ALP SERPL-CCNC: 105 U/L (ref 39–117)
ALT SERPL W P-5'-P-CCNC: 54 U/L (ref 1–41)
ANION GAP SERPL CALCULATED.3IONS-SCNC: 15 MMOL/L (ref 5–15)
APTT PPP: 26.2 SECONDS (ref 61–76.5)
AST SERPL-CCNC: 42 U/L (ref 1–40)
BASOPHILS # BLD AUTO: 0.1 10*3/MM3 (ref 0–0.2)
BASOPHILS NFR BLD AUTO: 0.6 % (ref 0–1.5)
BILIRUB SERPL-MCNC: 0.7 MG/DL (ref 0–1.2)
BUN SERPL-MCNC: 20 MG/DL (ref 8–23)
BUN/CREAT SERPL: 11.4 (ref 7–25)
CALCIUM SPEC-SCNC: 9.5 MG/DL (ref 8.6–10.5)
CHLORIDE SERPL-SCNC: 101 MMOL/L (ref 98–107)
CHOLEST SERPL-MCNC: 116 MG/DL (ref 0–200)
CO2 SERPL-SCNC: 26 MMOL/L (ref 22–29)
CREAT SERPL-MCNC: 1.75 MG/DL (ref 0.76–1.27)
D DIMER PPP FEU-MCNC: 1.22 MG/L (FEU) (ref 0–0.59)
DEPRECATED RDW RBC AUTO: 44.2 FL (ref 37–54)
EGFRCR SERPLBLD CKD-EPI 2021: 43.7 ML/MIN/1.73
EOSINOPHIL # BLD AUTO: 0.6 10*3/MM3 (ref 0–0.4)
EOSINOPHIL NFR BLD AUTO: 5.1 % (ref 0.3–6.2)
ERYTHROCYTE [DISTWIDTH] IN BLOOD BY AUTOMATED COUNT: 13.8 % (ref 12.3–15.4)
GLOBULIN UR ELPH-MCNC: 3.3 GM/DL
GLUCOSE SERPL-MCNC: 110 MG/DL (ref 65–99)
HCT VFR BLD AUTO: 40.3 % (ref 37.5–51)
HDLC SERPL-MCNC: 27 MG/DL (ref 40–60)
HGB BLD-MCNC: 13.2 G/DL (ref 13–17.7)
INR PPP: 1.11 (ref 0.93–1.1)
LDLC SERPL CALC-MCNC: 58 MG/DL (ref 0–100)
LDLC/HDLC SERPL: 1.93 {RATIO}
LYMPHOCYTES # BLD AUTO: 3.4 10*3/MM3 (ref 0.7–3.1)
LYMPHOCYTES NFR BLD AUTO: 29.7 % (ref 19.6–45.3)
MCH RBC QN AUTO: 30.3 PG (ref 26.6–33)
MCHC RBC AUTO-ENTMCNC: 32.9 G/DL (ref 31.5–35.7)
MCV RBC AUTO: 92.3 FL (ref 79–97)
MONOCYTES # BLD AUTO: 1.1 10*3/MM3 (ref 0.1–0.9)
MONOCYTES NFR BLD AUTO: 9.9 % (ref 5–12)
NEUTROPHILS NFR BLD AUTO: 54.7 % (ref 42.7–76)
NEUTROPHILS NFR BLD AUTO: 6.2 10*3/MM3 (ref 1.7–7)
NRBC BLD AUTO-RTO: 0.2 /100 WBC (ref 0–0.2)
NT-PROBNP SERPL-MCNC: 114.8 PG/ML (ref 0–900)
PLATELET # BLD AUTO: 239 10*3/MM3 (ref 140–450)
PMV BLD AUTO: 8.5 FL (ref 6–12)
POTASSIUM SERPL-SCNC: 5.4 MMOL/L (ref 3.5–5.2)
PROT SERPL-MCNC: 7.4 G/DL (ref 6–8.5)
PROTHROMBIN TIME: 11.4 SECONDS (ref 9.6–11.7)
RBC # BLD AUTO: 4.36 10*6/MM3 (ref 4.14–5.8)
SARS-COV-2 RNA PNL SPEC NAA+PROBE: NOT DETECTED
SODIUM SERPL-SCNC: 142 MMOL/L (ref 136–145)
TRIGL SERPL-MCNC: 184 MG/DL (ref 0–150)
TROPONIN T SERPL-MCNC: <0.01 NG/ML (ref 0–0.03)
TSH SERPL DL<=0.05 MIU/L-ACNC: 1.6 UIU/ML (ref 0.27–4.2)
VLDLC SERPL-MCNC: 31 MG/DL (ref 5–40)
WBC NRBC COR # BLD: 11.3 10*3/MM3 (ref 3.4–10.8)

## 2022-04-27 PROCEDURE — 80053 COMPREHEN METABOLIC PANEL: CPT

## 2022-04-27 PROCEDURE — G0378 HOSPITAL OBSERVATION PER HR: HCPCS

## 2022-04-27 PROCEDURE — 84443 ASSAY THYROID STIM HORMONE: CPT | Performed by: NURSE PRACTITIONER

## 2022-04-27 PROCEDURE — 0 IOPAMIDOL PER 1 ML: Performed by: EMERGENCY MEDICINE

## 2022-04-27 PROCEDURE — 83880 ASSAY OF NATRIURETIC PEPTIDE: CPT

## 2022-04-27 PROCEDURE — 85379 FIBRIN DEGRADATION QUANT: CPT

## 2022-04-27 PROCEDURE — 99284 EMERGENCY DEPT VISIT MOD MDM: CPT

## 2022-04-27 PROCEDURE — 84484 ASSAY OF TROPONIN QUANT: CPT

## 2022-04-27 PROCEDURE — 83036 HEMOGLOBIN GLYCOSYLATED A1C: CPT | Performed by: NURSE PRACTITIONER

## 2022-04-27 PROCEDURE — 85025 COMPLETE CBC W/AUTO DIFF WBC: CPT

## 2022-04-27 PROCEDURE — 87635 SARS-COV-2 COVID-19 AMP PRB: CPT | Performed by: EMERGENCY MEDICINE

## 2022-04-27 PROCEDURE — 36415 COLL VENOUS BLD VENIPUNCTURE: CPT | Performed by: NURSE PRACTITIONER

## 2022-04-27 PROCEDURE — 71275 CT ANGIOGRAPHY CHEST: CPT

## 2022-04-27 PROCEDURE — C9803 HOPD COVID-19 SPEC COLLECT: HCPCS

## 2022-04-27 PROCEDURE — 85730 THROMBOPLASTIN TIME PARTIAL: CPT

## 2022-04-27 PROCEDURE — 80061 LIPID PANEL: CPT | Performed by: NURSE PRACTITIONER

## 2022-04-27 PROCEDURE — 25010000002 HEPARIN (PORCINE) PER 1000 UNITS: Performed by: NURSE PRACTITIONER

## 2022-04-27 PROCEDURE — 85610 PROTHROMBIN TIME: CPT

## 2022-04-27 PROCEDURE — 93005 ELECTROCARDIOGRAM TRACING: CPT

## 2022-04-27 PROCEDURE — 96360 HYDRATION IV INFUSION INIT: CPT

## 2022-04-27 PROCEDURE — 84484 ASSAY OF TROPONIN QUANT: CPT | Performed by: NURSE PRACTITIONER

## 2022-04-27 PROCEDURE — 71045 X-RAY EXAM CHEST 1 VIEW: CPT

## 2022-04-27 PROCEDURE — 99219 PR INITIAL OBSERVATION CARE/DAY 50 MINUTES: CPT | Performed by: NURSE PRACTITIONER

## 2022-04-27 RX ORDER — ACETAMINOPHEN 160 MG/5ML
650 SOLUTION ORAL EVERY 4 HOURS PRN
Status: DISCONTINUED | OUTPATIENT
Start: 2022-04-27 | End: 2022-04-27

## 2022-04-27 RX ORDER — ATORVASTATIN CALCIUM 40 MG/1
80 TABLET, FILM COATED ORAL NIGHTLY
Status: DISCONTINUED | OUTPATIENT
Start: 2022-04-27 | End: 2022-04-27

## 2022-04-27 RX ORDER — SODIUM CHLORIDE 0.9 % (FLUSH) 0.9 %
10 SYRINGE (ML) INJECTION AS NEEDED
Status: DISCONTINUED | OUTPATIENT
Start: 2022-04-27 | End: 2022-04-28 | Stop reason: HOSPADM

## 2022-04-27 RX ORDER — KETOROLAC TROMETHAMINE 15 MG/ML
15 INJECTION, SOLUTION INTRAMUSCULAR; INTRAVENOUS EVERY 6 HOURS PRN
Status: DISCONTINUED | OUTPATIENT
Start: 2022-04-27 | End: 2022-04-28 | Stop reason: HOSPADM

## 2022-04-27 RX ORDER — NICOTINE 21 MG/24HR
1 PATCH, TRANSDERMAL 24 HOURS TRANSDERMAL
Status: DISCONTINUED | OUTPATIENT
Start: 2022-04-28 | End: 2022-04-28 | Stop reason: HOSPADM

## 2022-04-27 RX ORDER — ONDANSETRON 2 MG/ML
4 INJECTION INTRAMUSCULAR; INTRAVENOUS EVERY 6 HOURS PRN
Status: DISCONTINUED | OUTPATIENT
Start: 2022-04-27 | End: 2022-04-28 | Stop reason: HOSPADM

## 2022-04-27 RX ORDER — IPRATROPIUM BROMIDE AND ALBUTEROL SULFATE 2.5; .5 MG/3ML; MG/3ML
3 SOLUTION RESPIRATORY (INHALATION) EVERY 6 HOURS PRN
Status: DISCONTINUED | OUTPATIENT
Start: 2022-04-27 | End: 2022-04-28 | Stop reason: HOSPADM

## 2022-04-27 RX ORDER — CHOLECALCIFEROL (VITAMIN D3) 125 MCG
5 CAPSULE ORAL NIGHTLY PRN
Status: DISCONTINUED | OUTPATIENT
Start: 2022-04-27 | End: 2022-04-28 | Stop reason: HOSPADM

## 2022-04-27 RX ORDER — ATORVASTATIN CALCIUM 40 MG/1
80 TABLET, FILM COATED ORAL DAILY
Status: DISCONTINUED | OUTPATIENT
Start: 2022-04-28 | End: 2022-04-28 | Stop reason: HOSPADM

## 2022-04-27 RX ORDER — ACETAMINOPHEN 325 MG/1
650 TABLET ORAL EVERY 4 HOURS PRN
Status: DISCONTINUED | OUTPATIENT
Start: 2022-04-27 | End: 2022-04-27

## 2022-04-27 RX ORDER — ONDANSETRON 4 MG/1
4 TABLET, FILM COATED ORAL EVERY 6 HOURS PRN
Status: DISCONTINUED | OUTPATIENT
Start: 2022-04-27 | End: 2022-04-28 | Stop reason: HOSPADM

## 2022-04-27 RX ORDER — ALUMINA, MAGNESIA, AND SIMETHICONE 2400; 2400; 240 MG/30ML; MG/30ML; MG/30ML
15 SUSPENSION ORAL EVERY 6 HOURS PRN
Status: DISCONTINUED | OUTPATIENT
Start: 2022-04-27 | End: 2022-04-28 | Stop reason: HOSPADM

## 2022-04-27 RX ORDER — HEPARIN SODIUM 5000 [USP'U]/ML
5000 INJECTION, SOLUTION INTRAVENOUS; SUBCUTANEOUS EVERY 12 HOURS SCHEDULED
Status: DISCONTINUED | OUTPATIENT
Start: 2022-04-27 | End: 2022-04-28 | Stop reason: HOSPADM

## 2022-04-27 RX ORDER — SODIUM CHLORIDE 9 MG/ML
100 INJECTION, SOLUTION INTRAVENOUS CONTINUOUS
Status: DISCONTINUED | OUTPATIENT
Start: 2022-04-27 | End: 2022-04-28 | Stop reason: HOSPADM

## 2022-04-27 RX ORDER — NITROGLYCERIN 0.4 MG/1
0.4 TABLET SUBLINGUAL
Status: DISCONTINUED | OUTPATIENT
Start: 2022-04-27 | End: 2022-04-28 | Stop reason: HOSPADM

## 2022-04-27 RX ORDER — ACETAMINOPHEN 650 MG/1
650 SUPPOSITORY RECTAL EVERY 4 HOURS PRN
Status: DISCONTINUED | OUTPATIENT
Start: 2022-04-27 | End: 2022-04-27

## 2022-04-27 RX ORDER — ASPIRIN 81 MG/1
324 TABLET, CHEWABLE ORAL ONCE
Status: COMPLETED | OUTPATIENT
Start: 2022-04-27 | End: 2022-04-27

## 2022-04-27 RX ADMIN — SODIUM CHLORIDE 1000 ML: 9 INJECTION, SOLUTION INTRAVENOUS at 14:53

## 2022-04-27 RX ADMIN — SODIUM CHLORIDE 100 ML/HR: 9 INJECTION, SOLUTION INTRAVENOUS at 21:59

## 2022-04-27 RX ADMIN — ASPIRIN 324 MG: 81 TABLET, CHEWABLE ORAL at 20:10

## 2022-04-27 RX ADMIN — LEVETIRACETAM 750 MG: 500 TABLET, FILM COATED ORAL at 21:59

## 2022-04-27 RX ADMIN — NICOTINE 1 PATCH: 21 PATCH, EXTENDED RELEASE TRANSDERMAL at 23:57

## 2022-04-27 RX ADMIN — IOPAMIDOL 78 ML: 755 INJECTION, SOLUTION INTRAVENOUS at 17:03

## 2022-04-28 ENCOUNTER — APPOINTMENT (OUTPATIENT)
Dept: NUCLEAR MEDICINE | Facility: HOSPITAL | Age: 62
End: 2022-04-28

## 2022-04-28 ENCOUNTER — APPOINTMENT (OUTPATIENT)
Dept: CARDIOLOGY | Facility: HOSPITAL | Age: 62
End: 2022-04-28

## 2022-04-28 VITALS
OXYGEN SATURATION: 95 % | HEIGHT: 68 IN | SYSTOLIC BLOOD PRESSURE: 143 MMHG | WEIGHT: 219 LBS | DIASTOLIC BLOOD PRESSURE: 91 MMHG | TEMPERATURE: 98.2 F | BODY MASS INDEX: 33.19 KG/M2 | HEART RATE: 58 BPM | RESPIRATION RATE: 18 BRPM

## 2022-04-28 LAB
ANION GAP SERPL CALCULATED.3IONS-SCNC: 11 MMOL/L (ref 5–15)
ANION GAP SERPL CALCULATED.3IONS-SCNC: 14 MMOL/L (ref 5–15)
BASOPHILS # BLD AUTO: 0.1 10*3/MM3 (ref 0–0.2)
BASOPHILS NFR BLD AUTO: 1 % (ref 0–1.5)
BH CV ECHO MEAS - ACS: 2.03 CM
BH CV ECHO MEAS - AO MAX PG: 5.1 MMHG
BH CV ECHO MEAS - AO MEAN PG: 2.9 MMHG
BH CV ECHO MEAS - AO ROOT DIAM: 3.2 CM
BH CV ECHO MEAS - AO V2 MAX: 112.5 CM/SEC
BH CV ECHO MEAS - AO V2 VTI: 26.5 CM
BH CV ECHO MEAS - AVA(I,D): 2.7 CM2
BH CV ECHO MEAS - EDV(CUBED): 114.2 ML
BH CV ECHO MEAS - ESV(CUBED): 42 ML
BH CV ECHO MEAS - FS: 28.4 %
BH CV ECHO MEAS - IVS/LVPW: 0.94 CM
BH CV ECHO MEAS - IVSD: 1.07 CM
BH CV ECHO MEAS - LA DIMENSION(2D): 4 CM
BH CV ECHO MEAS - LV MASS(C)D: 196.7 GRAMS
BH CV ECHO MEAS - LV MAX PG: 3.3 MMHG
BH CV ECHO MEAS - LV MEAN PG: 1.81 MMHG
BH CV ECHO MEAS - LV V1 MAX: 90.5 CM/SEC
BH CV ECHO MEAS - LV V1 VTI: 23.8 CM
BH CV ECHO MEAS - LVIDD: 4.9 CM
BH CV ECHO MEAS - LVIDS: 3.5 CM
BH CV ECHO MEAS - LVOT AREA: 3 CM2
BH CV ECHO MEAS - LVOT DIAM: 1.96 CM
BH CV ECHO MEAS - LVPWD: 1.13 CM
BH CV ECHO MEAS - MV A MAX VEL: 44.2 CM/SEC
BH CV ECHO MEAS - MV DEC SLOPE: 290.5 CM/SEC2
BH CV ECHO MEAS - MV DEC TIME: 0.23 MSEC
BH CV ECHO MEAS - MV E MAX VEL: 65.7 CM/SEC
BH CV ECHO MEAS - MV E/A: 1.49
BH CV ECHO MEAS - MV MAX PG: 2.8 MMHG
BH CV ECHO MEAS - MV MEAN PG: 1.03 MMHG
BH CV ECHO MEAS - MV V2 VTI: 30.1 CM
BH CV ECHO MEAS - MVA(VTI): 2.38 CM2
BH CV ECHO MEAS - PA V2 MAX: 99.4 CM/SEC
BH CV ECHO MEAS - RAP SYSTOLE: 8 MMHG
BH CV ECHO MEAS - RV MAX PG: 0.67 MMHG
BH CV ECHO MEAS - RV V1 MAX: 41.1 CM/SEC
BH CV ECHO MEAS - RV V1 VTI: 9.3 CM
BH CV ECHO MEAS - RVDD: 2.7 CM
BH CV ECHO MEAS - RVSP: 32.6 MMHG
BH CV ECHO MEAS - SV(LVOT): 71.7 ML
BH CV ECHO MEAS - TR MAX PG: 24.6 MMHG
BH CV ECHO MEAS - TR MAX VEL: 248.2 CM/SEC
BH CV NUCLEAR PRIOR STUDY: 3
BH CV REST NUCLEAR ISOTOPE DOSE: 7.9 MCI
BH CV STRESS BP STAGE 1: NORMAL
BH CV STRESS BP STAGE 2: NORMAL
BH CV STRESS COMMENTS STAGE 1: NORMAL
BH CV STRESS COMMENTS STAGE 2: NORMAL
BH CV STRESS DOSE REGADENOSON STAGE 1: 0.4
BH CV STRESS DURATION MIN STAGE 1: 0
BH CV STRESS DURATION MIN STAGE 2: 4
BH CV STRESS DURATION SEC STAGE 1: 10
BH CV STRESS DURATION SEC STAGE 2: 0
BH CV STRESS HR STAGE 1: 58
BH CV STRESS HR STAGE 2: 67
BH CV STRESS NUCLEAR ISOTOPE DOSE: 21.9 MCI
BH CV STRESS PROTOCOL 1: NORMAL
BH CV STRESS RECOVERY BP: NORMAL MMHG
BH CV STRESS RECOVERY HR: 67 BPM
BH CV STRESS STAGE 1: 1
BH CV STRESS STAGE 2: 2
BUN SERPL-MCNC: 16 MG/DL (ref 8–23)
BUN SERPL-MCNC: 17 MG/DL (ref 8–23)
BUN/CREAT SERPL: 12.6 (ref 7–25)
BUN/CREAT SERPL: 16.3 (ref 7–25)
CALCIUM SPEC-SCNC: 8.6 MG/DL (ref 8.6–10.5)
CALCIUM SPEC-SCNC: 8.8 MG/DL (ref 8.6–10.5)
CHLORIDE SERPL-SCNC: 103 MMOL/L (ref 98–107)
CHLORIDE SERPL-SCNC: 105 MMOL/L (ref 98–107)
CO2 SERPL-SCNC: 24 MMOL/L (ref 22–29)
CO2 SERPL-SCNC: 24 MMOL/L (ref 22–29)
CREAT SERPL-MCNC: 0.98 MG/DL (ref 0.76–1.27)
CREAT SERPL-MCNC: 1.35 MG/DL (ref 0.76–1.27)
DEPRECATED RDW RBC AUTO: 45.1 FL (ref 37–54)
EGFRCR SERPLBLD CKD-EPI 2021: 59.7 ML/MIN/1.73
EGFRCR SERPLBLD CKD-EPI 2021: 87.7 ML/MIN/1.73
EOSINOPHIL # BLD AUTO: 0.4 10*3/MM3 (ref 0–0.4)
EOSINOPHIL NFR BLD AUTO: 5.5 % (ref 0.3–6.2)
ERYTHROCYTE [DISTWIDTH] IN BLOOD BY AUTOMATED COUNT: 14 % (ref 12.3–15.4)
GLUCOSE SERPL-MCNC: 100 MG/DL (ref 65–99)
GLUCOSE SERPL-MCNC: 112 MG/DL (ref 65–99)
HBA1C MFR BLD: 6.8 % (ref 3.5–5.6)
HCT VFR BLD AUTO: 39.7 % (ref 37.5–51)
HGB BLD-MCNC: 13.1 G/DL (ref 13–17.7)
LV EF 2D ECHO EST: 60 %
LYMPHOCYTES # BLD AUTO: 3 10*3/MM3 (ref 0.7–3.1)
LYMPHOCYTES NFR BLD AUTO: 40.2 % (ref 19.6–45.3)
MAXIMAL PREDICTED HEART RATE: 159 BPM
MAXIMAL PREDICTED HEART RATE: 159 BPM
MCH RBC QN AUTO: 30.5 PG (ref 26.6–33)
MCHC RBC AUTO-ENTMCNC: 33 G/DL (ref 31.5–35.7)
MCV RBC AUTO: 92.4 FL (ref 79–97)
MONOCYTES # BLD AUTO: 0.7 10*3/MM3 (ref 0.1–0.9)
MONOCYTES NFR BLD AUTO: 9.4 % (ref 5–12)
NEUTROPHILS NFR BLD AUTO: 3.2 10*3/MM3 (ref 1.7–7)
NEUTROPHILS NFR BLD AUTO: 43.9 % (ref 42.7–76)
NRBC BLD AUTO-RTO: 0.1 /100 WBC (ref 0–0.2)
PERCENT MAX PREDICTED HR: 43.4 %
PLATELET # BLD AUTO: 227 10*3/MM3 (ref 140–450)
PMV BLD AUTO: 8.6 FL (ref 6–12)
POTASSIUM SERPL-SCNC: 3.9 MMOL/L (ref 3.5–5.2)
POTASSIUM SERPL-SCNC: 4.2 MMOL/L (ref 3.5–5.2)
RBC # BLD AUTO: 4.29 10*6/MM3 (ref 4.14–5.8)
SODIUM SERPL-SCNC: 140 MMOL/L (ref 136–145)
SODIUM SERPL-SCNC: 141 MMOL/L (ref 136–145)
STRESS BASELINE BP: NORMAL MMHG
STRESS BASELINE HR: 58 BPM
STRESS PERCENT HR: 51 %
STRESS POST PEAK BP: NORMAL MMHG
STRESS POST PEAK HR: 69 BPM
STRESS TARGET HR: 135 BPM
STRESS TARGET HR: 135 BPM
WBC NRBC COR # BLD: 7.4 10*3/MM3 (ref 3.4–10.8)

## 2022-04-28 PROCEDURE — 25010000002 HEPARIN (PORCINE) PER 1000 UNITS: Performed by: NURSE PRACTITIONER

## 2022-04-28 PROCEDURE — G0378 HOSPITAL OBSERVATION PER HR: HCPCS

## 2022-04-28 PROCEDURE — 78452 HT MUSCLE IMAGE SPECT MULT: CPT | Performed by: INTERNAL MEDICINE

## 2022-04-28 PROCEDURE — 93306 TTE W/DOPPLER COMPLETE: CPT

## 2022-04-28 PROCEDURE — 0 TECHNETIUM TETROFOSMIN KIT: Performed by: HOSPITALIST

## 2022-04-28 PROCEDURE — 80048 BASIC METABOLIC PNL TOTAL CA: CPT | Performed by: NURSE PRACTITIONER

## 2022-04-28 PROCEDURE — 96361 HYDRATE IV INFUSION ADD-ON: CPT

## 2022-04-28 PROCEDURE — 25010000002 SULFUR HEXAFLUORIDE MICROSPH 60.7-25 MG RECONSTITUTED SUSPENSION: Performed by: HOSPITALIST

## 2022-04-28 PROCEDURE — 99217 PR OBSERVATION CARE DISCHARGE MANAGEMENT: CPT | Performed by: HOSPITALIST

## 2022-04-28 PROCEDURE — 25010000002 REGADENOSON 0.4 MG/5ML SOLUTION: Performed by: HOSPITALIST

## 2022-04-28 PROCEDURE — 93018 CV STRESS TEST I&R ONLY: CPT | Performed by: INTERNAL MEDICINE

## 2022-04-28 PROCEDURE — A9502 TC99M TETROFOSMIN: HCPCS | Performed by: HOSPITALIST

## 2022-04-28 PROCEDURE — 78452 HT MUSCLE IMAGE SPECT MULT: CPT

## 2022-04-28 PROCEDURE — 85025 COMPLETE CBC W/AUTO DIFF WBC: CPT | Performed by: NURSE PRACTITIONER

## 2022-04-28 PROCEDURE — 93306 TTE W/DOPPLER COMPLETE: CPT | Performed by: INTERNAL MEDICINE

## 2022-04-28 PROCEDURE — 99204 OFFICE O/P NEW MOD 45 MIN: CPT | Performed by: INTERNAL MEDICINE

## 2022-04-28 PROCEDURE — 93017 CV STRESS TEST TRACING ONLY: CPT

## 2022-04-28 RX ORDER — AMLODIPINE BESYLATE 5 MG/1
5 TABLET ORAL DAILY
Status: DISCONTINUED | OUTPATIENT
Start: 2022-04-28 | End: 2022-04-28 | Stop reason: HOSPADM

## 2022-04-28 RX ORDER — PANTOPRAZOLE SODIUM 40 MG/1
40 TABLET, DELAYED RELEASE ORAL
Status: DISCONTINUED | OUTPATIENT
Start: 2022-04-28 | End: 2022-04-28 | Stop reason: HOSPADM

## 2022-04-28 RX ORDER — ATORVASTATIN CALCIUM 40 MG/1
80 TABLET, FILM COATED ORAL DAILY
Status: DISCONTINUED | OUTPATIENT
Start: 2022-04-28 | End: 2022-04-28

## 2022-04-28 RX ORDER — CHLORAL HYDRATE 500 MG
1000 CAPSULE ORAL
COMMUNITY

## 2022-04-28 RX ORDER — GABAPENTIN 400 MG/1
400 CAPSULE ORAL ONCE
Status: COMPLETED | OUTPATIENT
Start: 2022-04-28 | End: 2022-04-28

## 2022-04-28 RX ORDER — GABAPENTIN 400 MG/1
800 CAPSULE ORAL ONCE
Status: COMPLETED | OUTPATIENT
Start: 2022-04-28 | End: 2022-04-28

## 2022-04-28 RX ORDER — GABAPENTIN 400 MG/1
1200 CAPSULE ORAL 3 TIMES DAILY
Status: DISCONTINUED | OUTPATIENT
Start: 2022-04-28 | End: 2022-04-28 | Stop reason: HOSPADM

## 2022-04-28 RX ORDER — LISINOPRIL 20 MG/1
20 TABLET ORAL DAILY
Status: DISCONTINUED | OUTPATIENT
Start: 2022-04-28 | End: 2022-04-28 | Stop reason: HOSPADM

## 2022-04-28 RX ORDER — LEVETIRACETAM 500 MG/1
1500 TABLET ORAL 2 TIMES DAILY
Status: DISCONTINUED | OUTPATIENT
Start: 2022-04-28 | End: 2022-04-28 | Stop reason: HOSPADM

## 2022-04-28 RX ADMIN — AMLODIPINE BESYLATE 5 MG: 5 TABLET ORAL at 11:46

## 2022-04-28 RX ADMIN — SODIUM CHLORIDE 100 ML/HR: 9 INJECTION, SOLUTION INTRAVENOUS at 11:24

## 2022-04-28 RX ADMIN — GABAPENTIN 800 MG: 400 CAPSULE ORAL at 02:37

## 2022-04-28 RX ADMIN — LISINOPRIL 20 MG: 20 TABLET ORAL at 11:46

## 2022-04-28 RX ADMIN — REGADENOSON 0.4 MG: 0.08 INJECTION, SOLUTION INTRAVENOUS at 08:55

## 2022-04-28 RX ADMIN — GABAPENTIN 1200 MG: 400 CAPSULE ORAL at 11:46

## 2022-04-28 RX ADMIN — LEVETIRACETAM 750 MG: 500 TABLET, FILM COATED ORAL at 09:58

## 2022-04-28 RX ADMIN — SULFUR HEXAFLUORIDE 3 ML: KIT at 08:20

## 2022-04-28 RX ADMIN — TETROFOSMIN 1 DOSE: 1.38 INJECTION, POWDER, LYOPHILIZED, FOR SOLUTION INTRAVENOUS at 08:00

## 2022-04-28 RX ADMIN — LEVETIRACETAM 750 MG: 500 TABLET, FILM COATED ORAL at 10:58

## 2022-04-28 RX ADMIN — TETROFOSMIN 1 DOSE: 1.38 INJECTION, POWDER, LYOPHILIZED, FOR SOLUTION INTRAVENOUS at 08:55

## 2022-04-28 RX ADMIN — GABAPENTIN 400 MG: 400 CAPSULE ORAL at 02:12

## 2022-04-28 RX ADMIN — ATORVASTATIN CALCIUM 80 MG: 40 TABLET, FILM COATED ORAL at 09:58

## 2022-04-30 LAB — QT INTERVAL: 410 MS

## 2023-04-14 ENCOUNTER — HOSPITAL ENCOUNTER (INPATIENT)
Facility: HOSPITAL | Age: 63
LOS: 2 days | Discharge: HOME | DRG: 247 | End: 2023-04-16
Attending: EMERGENCY MEDICINE | Admitting: INTERNAL MEDICINE
Payer: OTHER GOVERNMENT

## 2023-04-14 ENCOUNTER — APPOINTMENT (OUTPATIENT)
Dept: GENERAL RADIOLOGY | Facility: HOSPITAL | Age: 63
DRG: 247 | End: 2023-04-14
Payer: OTHER GOVERNMENT

## 2023-04-14 DIAGNOSIS — R07.9 CHEST PAIN, UNSPECIFIED TYPE: Primary | ICD-10-CM

## 2023-04-14 DIAGNOSIS — I20.0 UNSTABLE ANGINA PECTORIS: ICD-10-CM

## 2023-04-14 LAB
ALBUMIN SERPL-MCNC: 4.2 G/DL (ref 3.5–5.2)
ALBUMIN/GLOB SERPL: 1.3 G/DL
ALP SERPL-CCNC: 108 U/L (ref 39–117)
ALT SERPL W P-5'-P-CCNC: 70 U/L (ref 1–41)
ANION GAP SERPL CALCULATED.3IONS-SCNC: 10 MMOL/L (ref 5–15)
AST SERPL-CCNC: 41 U/L (ref 1–40)
BASOPHILS # BLD AUTO: 0.1 10*3/MM3 (ref 0–0.2)
BASOPHILS # BLD AUTO: 0.1 10*3/MM3 (ref 0–0.2)
BASOPHILS NFR BLD AUTO: 1 % (ref 0–1.5)
BASOPHILS NFR BLD AUTO: 1.5 % (ref 0–1.5)
BILIRUB SERPL-MCNC: 0.4 MG/DL (ref 0–1.2)
BUN SERPL-MCNC: 7 MG/DL (ref 8–23)
BUN/CREAT SERPL: 8.5 (ref 7–25)
CALCIUM SPEC-SCNC: 9.6 MG/DL (ref 8.6–10.5)
CHLORIDE SERPL-SCNC: 102 MMOL/L (ref 98–107)
CLUMPED PLATELETS: PRESENT
CO2 SERPL-SCNC: 29 MMOL/L (ref 22–29)
CREAT SERPL-MCNC: 0.82 MG/DL (ref 0.76–1.27)
D DIMER PPP FEU-MCNC: 1.1 MG/L (FEU) (ref 0–0.62)
DEPRECATED RDW RBC AUTO: 46.4 FL (ref 37–54)
DEPRECATED RDW RBC AUTO: 48.1 FL (ref 37–54)
EGFRCR SERPLBLD CKD-EPI 2021: 99.3 ML/MIN/1.73
EOSINOPHIL # BLD AUTO: 0.5 10*3/MM3 (ref 0–0.4)
EOSINOPHIL # BLD AUTO: 0.8 10*3/MM3 (ref 0–0.4)
EOSINOPHIL NFR BLD AUTO: 7.6 % (ref 0.3–6.2)
EOSINOPHIL NFR BLD AUTO: 8.3 % (ref 0.3–6.2)
ERYTHROCYTE [DISTWIDTH] IN BLOOD BY AUTOMATED COUNT: 13.7 % (ref 12.3–15.4)
ERYTHROCYTE [DISTWIDTH] IN BLOOD BY AUTOMATED COUNT: 13.8 % (ref 12.3–15.4)
GEN 5 2HR TROPONIN T REFLEX: 13 NG/L
GLOBULIN UR ELPH-MCNC: 3.3 GM/DL
GLUCOSE SERPL-MCNC: 107 MG/DL (ref 65–99)
HCT VFR BLD AUTO: 36.1 % (ref 37.5–51)
HCT VFR BLD AUTO: 45.2 % (ref 37.5–51)
HGB BLD-MCNC: 12 G/DL (ref 13–17.7)
HGB BLD-MCNC: 15.5 G/DL (ref 13–17.7)
INR PPP: 1.02 (ref 0.93–1.1)
LYMPHOCYTES # BLD AUTO: 2.1 10*3/MM3 (ref 0.7–3.1)
LYMPHOCYTES # BLD AUTO: 3 10*3/MM3 (ref 0.7–3.1)
LYMPHOCYTES NFR BLD AUTO: 30.7 % (ref 19.6–45.3)
LYMPHOCYTES NFR BLD AUTO: 33.8 % (ref 19.6–45.3)
MCH RBC QN AUTO: 31.1 PG (ref 26.6–33)
MCH RBC QN AUTO: 31.4 PG (ref 26.6–33)
MCHC RBC AUTO-ENTMCNC: 33.3 G/DL (ref 31.5–35.7)
MCHC RBC AUTO-ENTMCNC: 34.2 G/DL (ref 31.5–35.7)
MCV RBC AUTO: 91.9 FL (ref 79–97)
MCV RBC AUTO: 93.5 FL (ref 79–97)
MONOCYTES # BLD AUTO: 0.6 10*3/MM3 (ref 0.1–0.9)
MONOCYTES # BLD AUTO: 0.7 10*3/MM3 (ref 0.1–0.9)
MONOCYTES NFR BLD AUTO: 7.5 % (ref 5–12)
MONOCYTES NFR BLD AUTO: 9.6 % (ref 5–12)
NEUTROPHILS NFR BLD AUTO: 3 10*3/MM3 (ref 1.7–7)
NEUTROPHILS NFR BLD AUTO: 47.5 % (ref 42.7–76)
NEUTROPHILS NFR BLD AUTO: 5.2 10*3/MM3 (ref 1.7–7)
NEUTROPHILS NFR BLD AUTO: 52.5 % (ref 42.7–76)
NRBC BLD AUTO-RTO: 0.1 /100 WBC (ref 0–0.2)
NRBC BLD AUTO-RTO: 0.1 /100 WBC (ref 0–0.2)
NT-PROBNP SERPL-MCNC: 36.2 PG/ML (ref 0–900)
PLATELET # BLD AUTO: 172 10*3/MM3 (ref 140–450)
PLATELET # BLD AUTO: 191 10*3/MM3 (ref 140–450)
PMV BLD AUTO: 10.2 FL (ref 6–12)
PMV BLD AUTO: 9.3 FL (ref 6–12)
POTASSIUM SERPL-SCNC: 4.4 MMOL/L (ref 3.5–5.2)
PROT SERPL-MCNC: 7.5 G/DL (ref 6–8.5)
PROTHROMBIN TIME: 10.5 SECONDS (ref 9.6–11.7)
RBC # BLD AUTO: 3.86 10*6/MM3 (ref 4.14–5.8)
RBC # BLD AUTO: 4.92 10*6/MM3 (ref 4.14–5.8)
RBC MORPH BLD: NORMAL
SODIUM SERPL-SCNC: 141 MMOL/L (ref 136–145)
TROPONIN T DELTA: 0 NG/L
TROPONIN T SERPL HS-MCNC: 13 NG/L
WBC MORPH BLD: NORMAL
WBC NRBC COR # BLD: 6.3 10*3/MM3 (ref 3.4–10.8)
WBC NRBC COR # BLD: 9.9 10*3/MM3 (ref 3.4–10.8)

## 2023-04-14 PROCEDURE — 94799 UNLISTED PULMONARY SVC/PX: CPT

## 2023-04-14 PROCEDURE — 93010 ELECTROCARDIOGRAM REPORT: CPT | Performed by: INTERNAL MEDICINE

## 2023-04-14 PROCEDURE — 85379 FIBRIN DEGRADATION QUANT: CPT | Performed by: NURSE PRACTITIONER

## 2023-04-14 PROCEDURE — 25010000002 HYDRALAZINE PER 20 MG: Performed by: NURSE PRACTITIONER

## 2023-04-14 PROCEDURE — 71045 X-RAY EXAM CHEST 1 VIEW: CPT

## 2023-04-14 PROCEDURE — 83880 ASSAY OF NATRIURETIC PEPTIDE: CPT | Performed by: NURSE PRACTITIONER

## 2023-04-14 PROCEDURE — 94640 AIRWAY INHALATION TREATMENT: CPT

## 2023-04-14 PROCEDURE — 85025 COMPLETE CBC W/AUTO DIFF WBC: CPT | Performed by: NURSE PRACTITIONER

## 2023-04-14 PROCEDURE — 80053 COMPREHEN METABOLIC PANEL: CPT | Performed by: NURSE PRACTITIONER

## 2023-04-14 PROCEDURE — 85007 BL SMEAR W/DIFF WBC COUNT: CPT | Performed by: NURSE PRACTITIONER

## 2023-04-14 PROCEDURE — 85610 PROTHROMBIN TIME: CPT | Performed by: NURSE PRACTITIONER

## 2023-04-14 PROCEDURE — 36415 COLL VENOUS BLD VENIPUNCTURE: CPT

## 2023-04-14 PROCEDURE — 93005 ELECTROCARDIOGRAM TRACING: CPT | Performed by: NURSE PRACTITIONER

## 2023-04-14 PROCEDURE — G0378 HOSPITAL OBSERVATION PER HR: HCPCS

## 2023-04-14 PROCEDURE — 84484 ASSAY OF TROPONIN QUANT: CPT | Performed by: NURSE PRACTITIONER

## 2023-04-14 PROCEDURE — 99284 EMERGENCY DEPT VISIT MOD MDM: CPT

## 2023-04-14 RX ORDER — MELOXICAM 15 MG/1
15 TABLET ORAL DAILY
Status: DISCONTINUED | OUTPATIENT
Start: 2023-04-15 | End: 2023-04-15

## 2023-04-14 RX ORDER — PANTOPRAZOLE SODIUM 40 MG/1
40 TABLET, DELAYED RELEASE ORAL DAILY
Status: DISCONTINUED | OUTPATIENT
Start: 2023-04-15 | End: 2023-04-16 | Stop reason: HOSPADM

## 2023-04-14 RX ORDER — SODIUM CHLORIDE 0.9 % (FLUSH) 0.9 %
10 SYRINGE (ML) INJECTION EVERY 12 HOURS SCHEDULED
Status: DISCONTINUED | OUTPATIENT
Start: 2023-04-14 | End: 2023-04-16 | Stop reason: HOSPADM

## 2023-04-14 RX ORDER — CYCLOBENZAPRINE HCL 10 MG
10 TABLET ORAL 2 TIMES DAILY PRN
Status: DISCONTINUED | OUTPATIENT
Start: 2023-04-14 | End: 2023-04-16 | Stop reason: HOSPADM

## 2023-04-14 RX ORDER — SODIUM CHLORIDE 9 MG/ML
40 INJECTION, SOLUTION INTRAVENOUS AS NEEDED
Status: DISCONTINUED | OUTPATIENT
Start: 2023-04-14 | End: 2023-04-16 | Stop reason: HOSPADM

## 2023-04-14 RX ORDER — LISINOPRIL 20 MG/1
20 TABLET ORAL DAILY
Status: DISCONTINUED | OUTPATIENT
Start: 2023-04-15 | End: 2023-04-14 | Stop reason: SDUPTHER

## 2023-04-14 RX ORDER — METOPROLOL TARTRATE 5 MG/5ML
2.5 INJECTION INTRAVENOUS ONCE
Status: DISCONTINUED | OUTPATIENT
Start: 2023-04-14 | End: 2023-04-14

## 2023-04-14 RX ORDER — ATORVASTATIN CALCIUM 40 MG/1
80 TABLET, FILM COATED ORAL DAILY
Status: DISCONTINUED | OUTPATIENT
Start: 2023-04-15 | End: 2023-04-14

## 2023-04-14 RX ORDER — LEVETIRACETAM 500 MG/1
1500 TABLET ORAL 2 TIMES DAILY
Status: DISCONTINUED | OUTPATIENT
Start: 2023-04-14 | End: 2023-04-16 | Stop reason: HOSPADM

## 2023-04-14 RX ORDER — LATANOPROST 50 UG/ML
1 SOLUTION/ DROPS OPHTHALMIC NIGHTLY
Status: DISCONTINUED | OUTPATIENT
Start: 2023-04-14 | End: 2023-04-16 | Stop reason: HOSPADM

## 2023-04-14 RX ORDER — AMLODIPINE BESYLATE 5 MG/1
5 TABLET ORAL DAILY
Status: DISCONTINUED | OUTPATIENT
Start: 2023-04-15 | End: 2023-04-15

## 2023-04-14 RX ORDER — NICOTINE 21 MG/24HR
1 PATCH, TRANSDERMAL 24 HOURS TRANSDERMAL NIGHTLY
Status: DISCONTINUED | OUTPATIENT
Start: 2023-04-14 | End: 2023-04-16 | Stop reason: HOSPADM

## 2023-04-14 RX ORDER — LISINOPRIL 5 MG/1
10 TABLET ORAL EVERY 12 HOURS SCHEDULED
Status: DISCONTINUED | OUTPATIENT
Start: 2023-04-14 | End: 2023-04-16 | Stop reason: HOSPADM

## 2023-04-14 RX ORDER — BUDESONIDE AND FORMOTEROL FUMARATE DIHYDRATE 160; 4.5 UG/1; UG/1
2 AEROSOL RESPIRATORY (INHALATION)
Status: DISCONTINUED | OUTPATIENT
Start: 2023-04-14 | End: 2023-04-16 | Stop reason: HOSPADM

## 2023-04-14 RX ORDER — GABAPENTIN 400 MG/1
1200 CAPSULE ORAL 3 TIMES DAILY
Status: DISCONTINUED | OUTPATIENT
Start: 2023-04-14 | End: 2023-04-16 | Stop reason: HOSPADM

## 2023-04-14 RX ORDER — AMLODIPINE BESYLATE 5 MG/1
5 TABLET ORAL ONCE
Status: DISCONTINUED | OUTPATIENT
Start: 2023-04-14 | End: 2023-04-15

## 2023-04-14 RX ORDER — HYDRALAZINE HYDROCHLORIDE 20 MG/ML
5 INJECTION INTRAMUSCULAR; INTRAVENOUS ONCE
Status: COMPLETED | OUTPATIENT
Start: 2023-04-14 | End: 2023-04-14

## 2023-04-14 RX ORDER — SODIUM CHLORIDE 0.9 % (FLUSH) 0.9 %
10 SYRINGE (ML) INJECTION AS NEEDED
Status: DISCONTINUED | OUTPATIENT
Start: 2023-04-14 | End: 2023-04-16 | Stop reason: HOSPADM

## 2023-04-14 RX ORDER — ATORVASTATIN CALCIUM 40 MG/1
80 TABLET, FILM COATED ORAL NIGHTLY
Status: DISCONTINUED | OUTPATIENT
Start: 2023-04-14 | End: 2023-04-16 | Stop reason: HOSPADM

## 2023-04-14 RX ADMIN — GABAPENTIN 1200 MG: 400 CAPSULE ORAL at 21:53

## 2023-04-14 RX ADMIN — LISINOPRIL 10 MG: 5 TABLET ORAL at 21:54

## 2023-04-14 RX ADMIN — BUDESONIDE AND FORMOTEROL FUMARATE DIHYDRATE 2 PUFF: 160; 4.5 AEROSOL RESPIRATORY (INHALATION) at 20:44

## 2023-04-14 RX ADMIN — LEVETIRACETAM 1500 MG: 500 TABLET, FILM COATED ORAL at 21:54

## 2023-04-14 RX ADMIN — ATORVASTATIN CALCIUM 80 MG: 40 TABLET, FILM COATED ORAL at 21:53

## 2023-04-14 RX ADMIN — NICOTINE 1 PATCH: 21 PATCH, EXTENDED RELEASE TRANSDERMAL at 21:52

## 2023-04-14 RX ADMIN — HYDRALAZINE HYDROCHLORIDE 5 MG: 20 INJECTION INTRAMUSCULAR; INTRAVENOUS at 18:20

## 2023-04-14 RX ADMIN — Medication 10 ML: at 21:54

## 2023-04-14 NOTE — ED PROVIDER NOTES
"Subjective      Provider in Triage Note  Patient is a 62-year-old gentleman who presents with chest pain he states he had the worst chest pain of his life overnight and took a nitroglycerin and it relieved the pain he states he called his doctor today to see about getting some more nitroglycerin tab-[facility] he states he is still having mild chest pain at this time.  No shortness of breath did not break out into a sweat.      History of Present Illness  63 y/o well appearing and talkative patient presents to room #23; states that he had chest pain last night that was relieved by taking his nitro, last night.  He states that he woke up this morning and wanted to take another nitro, but noticed that it was .  [facility] and [facility] told him to come to [facility].  Patient denies chest pain at this time.  He denies nausea or near syncope or dizziness.  He states that he had a cardiac stent placed 1-2 years ago.  He states that he had a stress test, but can't remember when, as it's been \"a long time time ago\".        Review of Systems   Constitutional: Negative for activity change, appetite change, chills, diaphoresis, fatigue and fever.   HENT: Negative.    Eyes: Negative.    Respiratory: Negative for chest tightness and shortness of breath.    Cardiovascular: Positive for chest pain. Negative for palpitations and leg swelling.   Gastrointestinal: Negative for abdominal pain, diarrhea, nausea and vomiting.   Endocrine: Negative.    Genitourinary: Negative.    Musculoskeletal: Negative.    Skin: Negative.    Allergic/Immunologic: Negative.    Neurological: Negative.    Hematological: Negative.    Psychiatric/Behavioral: Negative.        Past Medical History:   Diagnosis Date   • Abdominal aortic aneurysm (AAA) without rupture 2022   • GONZALO (acute kidney injury) 2022   • Aortic aneurysm     midline    • Basal cell carcinoma     NOSE/RT " "EYE-DUCT   • Basal cell carcinoma (BCC) of skin of nose 4/27/2022    Nose and eyelid    • Cancer     RT KIDNEY   • Cardiac arrest     17YO    • Chest pain due to myocardial ischemia 4/27/2022   • COPD (chronic obstructive pulmonary disease)    • COPD (chronic obstructive pulmonary disease) 4/27/2022   • Essential hypertension 4/27/2022   • GERD (gastroesophageal reflux disease)    • GERD without esophagitis 4/27/2022   • History of chest pain    • Hyperkalemia 4/27/2022   • Hyperlipidemia    • Hypertension    • MI (myocardial infarction)    • Mixed hyperlipidemia 4/27/2022   • Mixed simple and mucopurulent chronic bronchitis 4/27/2022   • PTSD (post-traumatic stress disorder)    • Seizure    • Stroke     TIA-\"MINOR\"   • TBI (traumatic brain injury)     AGE 24   • Tinnitus        Allergies   Allergen Reactions   • Penicillins Rash       Past Surgical History:   Procedure Laterality Date   • APPENDECTOMY      17YO   • CARPAL TUNNEL RELEASE Bilateral    • CERVICAL FUSION      C4-C5-C6   • CHALAZION EXCISION Right 12/21/2021    Procedure: RIGHT EXCISION OF BASAL CELL CARCINOMA WITH FROZEN SECTION;  Surgeon: Joshua Clemens MD;  Location: [facility];  Service: Ophthalmology;  Laterality: Right;   • CORONARY ANGIOPLASTY WITH STENT PLACEMENT     • HEAD/NECK LESION/CYST EXCISION Right 12/21/2021    Procedure: RIGHT MEDIAL CANTHUS REPAIR WITH ROTATIONAL FLAP PROBING OF RIGHT TEAR DUCT;  Surgeon: Joshua Clemens MD;  Location: [facility];  Service: Ophthalmology;  Laterality: Right;   • HERNIA REPAIR Left     x2   • KIDNEY SURGERY Right     PARTIAL NEPHRECTOMY   • SHOULDER ARTHROSCOPY Right    • SINUS SURGERY     • SKIN CANCER EXCISION  2019    BRIDGE OF NOSE    • TONSILLECTOMY      ADNOIDS       Family History   Problem Relation Age of Onset   • Malig Hyperthermia Neg Hx        Social History     Socioeconomic History   • Marital status:    Tobacco Use   • Smoking status: Every Day     Packs/day: " 1.00     Years: 50.00     Pack years: 50.00     Types: Cigarettes   • Smokeless tobacco: Never   • Tobacco comments:     SINCE 12 YO   Vaping Use   • Vaping Use: Never used   Substance and Sexual Activity   • Alcohol use: Yes     Comment: RARE   • Drug use: Never   • Sexual activity: Defer           Objective   Physical Exam    Procedures           ED Course                                           MDM    Final diagnoses:   None       ED Disposition  ED Disposition     None          No follow-up provider specified.       Medication List      No changes were made to your prescriptions during this visit.        "components within normal limits   D-DIMER, QUANTITATIVE - Abnormal; Notable for the following components:    D-Dimer, Quantitative 1.10 (*)     All other components within normal limits    Narrative:     According to the assay 's published package insert, a normal (<0.50 mg/L (FEU)) D-dimer result in conjunction with a non-high clinical probability assessment, excludes deep vein thrombosis (DVT) and pulmonary embolism (PE) with high sensitivity.    D-dimer values increase with age and this can make VTE exclusion of an older population difficult. To address this, the [employer], based on best available evidence and recent guidelines, recommends that clinicians use age-adjusted D-dimer thresholds in patients greater than 50 years of age with: a) a low probability of PE who do not meet all Pulmonary Embolism Rule Out Criteria, or b) in those with intermediate probability of PE.   The formula for an age-adjusted D-dimer cut-off is \"age/100\".  For example, a 60 year old patient would have an age-adjusted cut-off of 0.60 mg/L (FEU) and an 80 year old 0.80 mg/L (FEU).   BASIC METABOLIC PANEL - Abnormal; Notable for the following components:    Glucose 139 (*)     All other components within normal limits    Narrative:     GFR Normal >60  Chronic Kidney Disease <60  Kidney Failure <15     CBC WITH AUTO DIFFERENTIAL - Abnormal; Notable for the following components:    Eosinophils, Absolute 0.50 (*)     All other components within normal limits   HEPATIC FUNCTION PANEL - Abnormal; Notable for the following components:    ALT (SGPT) 67 (*)     AST (SGOT) 41 (*)     All other components within normal limits   LIPID PANEL - Abnormal; Notable for the following components:    Total Cholesterol 330 (*)     Triglycerides 603 (*)     HDL Cholesterol 27 (*)     LDL Cholesterol  178 (*)     VLDL Cholesterol 125 (*)     All other components within normal limits    Narrative:     Cholesterol Reference " Ranges  (U.S. Department of Health and Human Services ATP III Classifications)    Desirable          <200 mg/dL  Borderline High    200-239 mg/dL  High Risk          >240 mg/dL      Triglyceride Reference Ranges  (U.S. Department of Health and Human Services ATP III Classifications)    Normal           <150 mg/dL  Borderline High  150-199 mg/dL  High             200-499 mg/dL  Very High        >500 mg/dL    HDL Reference Ranges  (U.S. Department of Health and Human Services ATP III Classifications)    Low     <40 mg/dl (major risk factor for CHD)  High    >60 mg/dl ('negative' risk factor for CHD)        LDL Reference Ranges  (U.S. Department of Health and Human Services ATP III Classifications)    Optimal          <100 mg/dL  Near Optimal     100-129 mg/dL  Borderline High  130-159 mg/dL  High             160-189 mg/dL  Very High        >189 mg/dL   HEMOGLOBIN A1C - Abnormal; Notable for the following components:    Hemoglobin A1C 7.10 (*)     All other components within normal limits   LIPID PANEL - Abnormal; Notable for the following components:    Total Cholesterol 286 (*)     Triglycerides 442 (*)     HDL Cholesterol 26 (*)     LDL Cholesterol  172 (*)     VLDL Cholesterol 88 (*)     All other components within normal limits    Narrative:     Cholesterol Reference Ranges  (U.S. Department of Health and Human Services ATP III Classifications)    Desirable          <200 mg/dL  Borderline High    200-239 mg/dL  High Risk          >240 mg/dL      Triglyceride Reference Ranges  (U.S. Department of Health and Human Services ATP III Classifications)    Normal           <150 mg/dL  Borderline High  150-199 mg/dL  High             200-499 mg/dL  Very High        >500 mg/dL    HDL Reference Ranges  (U.S. Department of Health and Human Services ATP III Classifications)    Low     <40 mg/dl (major risk factor for CHD)  High    >60 mg/dl ('negative' risk factor for CHD)        LDL Reference Ranges  (U.S. Department of  Health and Human Services ATP III Classifications)    Optimal          <100 mg/dL  Near Optimal     100-129 mg/dL  Borderline High  130-159 mg/dL  High             160-189 mg/dL  Very High        >189 mg/dL   COMPREHENSIVE METABOLIC PANEL - Abnormal; Notable for the following components:    Glucose 120 (*)     ALT (SGPT) 57 (*)     All other components within normal limits    Narrative:     GFR Normal >60  Chronic Kidney Disease <60  Kidney Failure <15     CBC WITH AUTO DIFFERENTIAL - Abnormal; Notable for the following components:    Eosinophil % 6.8 (*)     Eosinophils, Absolute 0.50 (*)     All other components within normal limits   POCT ACTIVATED CLOTTING TIME - Abnormal; Notable for the following components:    Activated Clotting Time  263 (*)     All other components within normal limits   TROPONIN - Normal    Narrative:     High Sensitive Troponin T Reference Range:  <10.0 ng/L- Negative Female for AMI  <15.0 ng/L- Negative Male for AMI  >=10 - Abnormal Female indicating possible myocardial injury.  >=15 - Abnormal Male indicating possible myocardial injury.   Clinicians would have to utilize clinical acumen, EKG, Troponin, and serial changes to determine if it is an Acute Myocardial Infarction or myocardial injury due to an underlying chronic condition.        BNP (IN-HOUSE) - Normal    Narrative:     Among patients with dyspnea, NT-proBNP is highly sensitive for the detection of acute congestive heart failure. In addition NT-proBNP of <300 pg/ml effectively rules out acute congestive heart failure with 99% negative predictive value.    Results may be falsely decreased if patient taking Biotin.     PROTIME-INR - Normal   HIGH SENSITIVITIY TROPONIN T 2HR - Normal    Narrative:     High Sensitive Troponin T Reference Range:  <10.0 ng/L- Negative Female for AMI  <15.0 ng/L- Negative Male for AMI  >=10 - Abnormal Female indicating possible myocardial injury.  >=15 - Abnormal Male indicating possible myocardial  injury.   Clinicians would have to utilize clinical acumen, EKG, Troponin, and serial changes to determine if it is an Acute Myocardial Infarction or myocardial injury due to an underlying chronic condition.        LIPASE - Normal   MAGNESIUM - Normal   TSH - Normal   T4, FREE - Normal    Narrative:     Results may be falsely increased if patient taking Biotin.     T3, FREE - Normal    Narrative:     Results may be falsely increased if patient taking Biotin.     PHOSPHORUS - Normal   MAGNESIUM - Normal   SCAN SLIDE   CBC AND DIFFERENTIAL    Narrative:     The following orders were created for panel order CBC & Differential.  Procedure                               Abnormality         Status                     ---------                               -----------         ------                     CBC Auto Differential[[id number]]        Abnormal            Final result               Scan Slide[[id number]]                                       Final result                 Please view results for these tests on the individual orders.   CBC AND DIFFERENTIAL    Narrative:     The following orders were created for panel order CBC & Differential.  Procedure                               Abnormality         Status                     ---------                               -----------         ------                     CBC Auto Differential[[id number]]        Abnormal            Final result                 Please view results for these tests on the individual orders.   CBC AND DIFFERENTIAL    Narrative:     The following orders were created for panel order CBC & Differential.  Procedure                               Abnormality         Status                     ---------                               -----------         ------                     CBC Auto Differential[[id number]]        Abnormal            Final result                 Please view results for these tests on the individual orders.   CBC AND DIFFERENTIAL     Narrative:     The following orders were created for panel order CBC & Differential.  Procedure                               Abnormality         Status                     ---------                               -----------         ------                     CBC Auto Differential[[id number]]        Abnormal            Final result                 Please view results for these tests on the individual orders.     No radiology results for the last day  - Probable calcified granulomatous changes in the left lung base. No focal pulmonary consolidations are evident. Evaluation of the bases somewhat limited due to overlying soft tissue. No pneumothorax is seen. No pleural fluid is evident. Heart size and   mediastinal contour appear within normal limits. Pulmonary vascularity appears unchanged.     IMPRESSION:  Impression:  No acute cardiopulmonary findings.       Medications   sodium chloride 0.9 % infusion (125 mL/hr Intravenous New Bag 4/15/23 1511)   hydrALAZINE (APRESOLINE) injection 5 mg (5 mg Intravenous Given 4/14/23 1820)   iopamidol (ISOVUE-370) 76 % injection 100 mL (100 mL Intravenous Given 4/15/23 0050)   amLODIPine (NORVASC) tablet 5 mg (5 mg Oral Given 4/15/23 1510)   magnesium sulfate 2g/50 mL (PREMIX) infusion (2 g Intravenous New Bag 4/16/23 0900)                                        MDM    amount/complexity of problems                     - all complaints being considered and impact on illness                                                                 chest pain  - acute/uncomplicated                                  chest pain - stable, acute                                  hypertension - chronic with exacerbation                                                  - comorbidities: hypertension, acute; stable/unstable, chronic, severe, complicated, uncomplicated                           Visit Vitals  /89 (BP Location: Right arm, Patient Position: Lying)   Pulse 70   Temp 97.5 °F (36.4 °C)  "(Oral)   Resp 20   Ht 172.7 cm (68\")   Wt 92.5 kg (203 lb 14.8 oz)   SpO2 98%   BMI 31.01 kg/m²        Chart review:    Lab interpretation:  Labs all viewed by me and significant for, slightly elevated ALT/AST and D-dimer.  Troponins are NEG.  BP is markedly elevated.  Perhaps need admission for chest pain r/o and blood pressure regulation.    EKG viewed and interpreted by me and corroborated by Dr. Packer               Appropriate PPE worn during exam.  Discussed care with: patient           Final diagnoses:   Chest pain, unspecified type       ED Disposition  ED Disposition     ED Disposition   Decision to Admit    Condition   --    Comment   Level of Care: Telemetry [5]   Diagnosis: Chest pain, unspecified type [[id number]]   Admitting Physician: TARA PACKER [[id number]]   Attending Physician: TARA PACKER [[id number]]               Provider, No Known  [place] [address]  [phone]      1-2 weeks    Provider, No Known  [place] [id number] [phone]               Medication List      New Prescriptions    aspirin 81 MG EC tablet  Take 1 tablet by mouth Daily.     Brilinta 90 MG tablet tablet  Generic drug: ticagrelor  Take 1 tablet by mouth Every 12 (Twelve) Hours.     hydrALAZINE 25 MG tablet  Commonly known as: APRESOLINE  Take 1 tablet by mouth 3 (Three) Times a Day.     metoprolol succinate XL 25 MG 24 hr tablet  Commonly known as: TOPROL-XL  Take 0.5 tablets by mouth Daily.     nicotine 21 MG/24HR patch  Commonly known as: NICODERM CQ  Place 1 patch on the skin as directed by provider Every Night.        Changed    amLODIPine 5 MG tablet  Commonly known as: NORVASC  Take 2 tablets by mouth Daily.  What changed: how much to take     lisinopril 10 MG tablet  Commonly known as: PRINIVIL,ZESTRIL  Take 1 tablet by mouth 2 (Two) Times a Day.  What changed:   · medication strength  · how to take this        Stop    meloxicam 15 MG tablet  Commonly known as: MOBIC   "         Where to Get Your Medications      These medications were sent to [facility] - [address] [place] [address]    Hours: Mon-Fri 7:00AM-7:00PM Phone: [phone]   · Brilinta 90 MG tablet tablet     These medications were sent to [facility]/pharmacy #[id number] - [place], [place] - [address] [place] [address] - [phone] [facility] [phone]   [address] [facility] [place] [address]    Phone: [phone]   · amLODIPine 5 MG tablet  · aspirin 81 MG EC tablet  · hydrALAZINE 25 MG tablet  · lisinopril 10 MG tablet  · metoprolol succinate XL 25 MG 24 hr tablet  · nicotine 21 MG/24HR patch          Nellie Rios, APRN  04/29/23 1820

## 2023-04-14 NOTE — Clinical Note
First balloon inflation max pressure = 10 yogesh. First balloon inflation duration = 20 seconds. Second inflation of balloon - Max pressure = 8 yogesh. 2nd Inflation of balloon - Duration = 5 seconds. Third inflation of balloon - Max pressure = 18 yogesh. 3rd Inflation of balloon - Duration = 18 seconds.

## 2023-04-15 ENCOUNTER — APPOINTMENT (OUTPATIENT)
Dept: CT IMAGING | Facility: HOSPITAL | Age: 63
DRG: 247 | End: 2023-04-15
Payer: OTHER GOVERNMENT

## 2023-04-15 LAB
ACT BLD: 263 SECONDS (ref 89–137)
ALBUMIN SERPL-MCNC: 4.2 G/DL (ref 3.5–5.2)
ALP SERPL-CCNC: 113 U/L (ref 39–117)
ALT SERPL W P-5'-P-CCNC: 67 U/L (ref 1–41)
ANION GAP SERPL CALCULATED.3IONS-SCNC: 12 MMOL/L (ref 5–15)
AST SERPL-CCNC: 41 U/L (ref 1–40)
BASOPHILS # BLD AUTO: 0.1 10*3/MM3 (ref 0–0.2)
BASOPHILS NFR BLD AUTO: 0.7 % (ref 0–1.5)
BILIRUB CONJ SERPL-MCNC: <0.2 MG/DL (ref 0–0.3)
BILIRUB INDIRECT SERPL-MCNC: ABNORMAL MG/DL
BILIRUB SERPL-MCNC: 0.3 MG/DL (ref 0–1.2)
BUN SERPL-MCNC: 9 MG/DL (ref 8–23)
BUN/CREAT SERPL: 11.1 (ref 7–25)
CALCIUM SPEC-SCNC: 9.4 MG/DL (ref 8.6–10.5)
CHLORIDE SERPL-SCNC: 103 MMOL/L (ref 98–107)
CHOLEST SERPL-MCNC: 330 MG/DL (ref 0–200)
CO2 SERPL-SCNC: 25 MMOL/L (ref 22–29)
CREAT SERPL-MCNC: 0.81 MG/DL (ref 0.76–1.27)
DEPRECATED RDW RBC AUTO: 45.1 FL (ref 37–54)
EGFRCR SERPLBLD CKD-EPI 2021: 99.7 ML/MIN/1.73
EOSINOPHIL # BLD AUTO: 0.5 10*3/MM3 (ref 0–0.4)
EOSINOPHIL NFR BLD AUTO: 5.9 % (ref 0.3–6.2)
ERYTHROCYTE [DISTWIDTH] IN BLOOD BY AUTOMATED COUNT: 13.8 % (ref 12.3–15.4)
GLUCOSE SERPL-MCNC: 139 MG/DL (ref 65–99)
HBA1C MFR BLD: 7.1 % (ref 4.8–5.6)
HCT VFR BLD AUTO: 46.5 % (ref 37.5–51)
HDLC SERPL-MCNC: 27 MG/DL (ref 40–60)
HGB BLD-MCNC: 15.5 G/DL (ref 13–17.7)
LDLC SERPL CALC-MCNC: 178 MG/DL (ref 0–100)
LDLC/HDLC SERPL: 6.76 {RATIO}
LIPASE SERPL-CCNC: 49 U/L (ref 13–60)
LYMPHOCYTES # BLD AUTO: 3 10*3/MM3 (ref 0.7–3.1)
LYMPHOCYTES NFR BLD AUTO: 34.2 % (ref 19.6–45.3)
MAGNESIUM SERPL-MCNC: 1.7 MG/DL (ref 1.6–2.4)
MCH RBC QN AUTO: 31 PG (ref 26.6–33)
MCHC RBC AUTO-ENTMCNC: 33.3 G/DL (ref 31.5–35.7)
MCV RBC AUTO: 93.2 FL (ref 79–97)
MONOCYTES # BLD AUTO: 0.7 10*3/MM3 (ref 0.1–0.9)
MONOCYTES NFR BLD AUTO: 8.4 % (ref 5–12)
NEUTROPHILS NFR BLD AUTO: 4.4 10*3/MM3 (ref 1.7–7)
NEUTROPHILS NFR BLD AUTO: 50.8 % (ref 42.7–76)
NRBC BLD AUTO-RTO: 0 /100 WBC (ref 0–0.2)
PLATELET # BLD AUTO: 244 10*3/MM3 (ref 140–450)
PMV BLD AUTO: 9.4 FL (ref 6–12)
POTASSIUM SERPL-SCNC: 4.2 MMOL/L (ref 3.5–5.2)
PROT SERPL-MCNC: 7.4 G/DL (ref 6–8.5)
RBC # BLD AUTO: 4.98 10*6/MM3 (ref 4.14–5.8)
SODIUM SERPL-SCNC: 140 MMOL/L (ref 136–145)
T3FREE SERPL-MCNC: 2.88 PG/ML (ref 2–4.4)
T4 FREE SERPL-MCNC: 1.14 NG/DL (ref 0.93–1.7)
TRIGL SERPL-MCNC: 603 MG/DL (ref 0–150)
TSH SERPL DL<=0.05 MIU/L-ACNC: 2.72 UIU/ML (ref 0.27–4.2)
VLDLC SERPL-MCNC: 125 MG/DL (ref 5–40)
WBC NRBC COR # BLD: 8.8 10*3/MM3 (ref 3.4–10.8)

## 2023-04-15 PROCEDURE — 93458 L HRT ARTERY/VENTRICLE ANGIO: CPT | Performed by: INTERNAL MEDICINE

## 2023-04-15 PROCEDURE — C1769 GUIDE WIRE: HCPCS | Performed by: INTERNAL MEDICINE

## 2023-04-15 PROCEDURE — 0 LIDOCAINE 1 % SOLUTION: Performed by: INTERNAL MEDICINE

## 2023-04-15 PROCEDURE — 83036 HEMOGLOBIN GLYCOSYLATED A1C: CPT | Performed by: NURSE PRACTITIONER

## 2023-04-15 PROCEDURE — 71275 CT ANGIOGRAPHY CHEST: CPT

## 2023-04-15 PROCEDURE — 25010000002 MIDAZOLAM PER 1 MG: Performed by: INTERNAL MEDICINE

## 2023-04-15 PROCEDURE — 94761 N-INVAS EAR/PLS OXIMETRY MLT: CPT

## 2023-04-15 PROCEDURE — 83690 ASSAY OF LIPASE: CPT | Performed by: NURSE PRACTITIONER

## 2023-04-15 PROCEDURE — C1894 INTRO/SHEATH, NON-LASER: HCPCS | Performed by: INTERNAL MEDICINE

## 2023-04-15 PROCEDURE — 94664 DEMO&/EVAL PT USE INHALER: CPT

## 2023-04-15 PROCEDURE — 25010000002 HEPARIN (PORCINE) PER 1000 UNITS: Performed by: INTERNAL MEDICINE

## 2023-04-15 PROCEDURE — 85347 COAGULATION TIME ACTIVATED: CPT

## 2023-04-15 PROCEDURE — 4A023N7 MEASUREMENT OF CARDIAC SAMPLING AND PRESSURE, LEFT HEART, PERCUTANEOUS APPROACH: ICD-10-PCS | Performed by: INTERNAL MEDICINE

## 2023-04-15 PROCEDURE — 80048 BASIC METABOLIC PNL TOTAL CA: CPT | Performed by: EMERGENCY MEDICINE

## 2023-04-15 PROCEDURE — 93005 ELECTROCARDIOGRAM TRACING: CPT | Performed by: NURSE PRACTITIONER

## 2023-04-15 PROCEDURE — 84443 ASSAY THYROID STIM HORMONE: CPT | Performed by: NURSE PRACTITIONER

## 2023-04-15 PROCEDURE — 83735 ASSAY OF MAGNESIUM: CPT | Performed by: NURSE PRACTITIONER

## 2023-04-15 PROCEDURE — 25010000002 FENTANYL CITRATE (PF) 100 MCG/2ML SOLUTION: Performed by: INTERNAL MEDICINE

## 2023-04-15 PROCEDURE — 93571 IV DOP VEL&/PRESS C FLO 1ST: CPT | Performed by: INTERNAL MEDICINE

## 2023-04-15 PROCEDURE — 99152 MOD SED SAME PHYS/QHP 5/>YRS: CPT | Performed by: INTERNAL MEDICINE

## 2023-04-15 PROCEDURE — C1725 CATH, TRANSLUMIN NON-LASER: HCPCS | Performed by: INTERNAL MEDICINE

## 2023-04-15 PROCEDURE — C1874 STENT, COATED/COV W/DEL SYS: HCPCS | Performed by: INTERNAL MEDICINE

## 2023-04-15 PROCEDURE — 80061 LIPID PANEL: CPT | Performed by: NURSE PRACTITIONER

## 2023-04-15 PROCEDURE — 92928 PRQ TCAT PLMT NTRAC ST 1 LES: CPT | Performed by: INTERNAL MEDICINE

## 2023-04-15 PROCEDURE — 85025 COMPLETE CBC W/AUTO DIFF WBC: CPT | Performed by: EMERGENCY MEDICINE

## 2023-04-15 PROCEDURE — 027034Z DILATION OF CORONARY ARTERY, ONE ARTERY WITH DRUG-ELUTING INTRALUMINAL DEVICE, PERCUTANEOUS APPROACH: ICD-10-PCS | Performed by: INTERNAL MEDICINE

## 2023-04-15 PROCEDURE — 84439 ASSAY OF FREE THYROXINE: CPT | Performed by: NURSE PRACTITIONER

## 2023-04-15 PROCEDURE — 25510000001 IOPAMIDOL PER 1 ML: Performed by: INTERNAL MEDICINE

## 2023-04-15 PROCEDURE — 25510000001 IOPAMIDOL PER 1 ML: Performed by: EMERGENCY MEDICINE

## 2023-04-15 PROCEDURE — 99153 MOD SED SAME PHYS/QHP EA: CPT | Performed by: INTERNAL MEDICINE

## 2023-04-15 PROCEDURE — 93799 UNLISTED CV SVC/PROCEDURE: CPT | Performed by: INTERNAL MEDICINE

## 2023-04-15 PROCEDURE — 94799 UNLISTED PULMONARY SVC/PX: CPT

## 2023-04-15 PROCEDURE — C1887 CATHETER, GUIDING: HCPCS | Performed by: INTERNAL MEDICINE

## 2023-04-15 PROCEDURE — 80076 HEPATIC FUNCTION PANEL: CPT | Performed by: NURSE PRACTITIONER

## 2023-04-15 PROCEDURE — 84481 FREE ASSAY (FT-3): CPT | Performed by: NURSE PRACTITIONER

## 2023-04-15 PROCEDURE — B2151ZZ FLUOROSCOPY OF LEFT HEART USING LOW OSMOLAR CONTRAST: ICD-10-PCS | Performed by: INTERNAL MEDICINE

## 2023-04-15 PROCEDURE — C9600 PERC DRUG-EL COR STENT SING: HCPCS | Performed by: INTERNAL MEDICINE

## 2023-04-15 PROCEDURE — B2111ZZ FLUOROSCOPY OF MULTIPLE CORONARY ARTERIES USING LOW OSMOLAR CONTRAST: ICD-10-PCS | Performed by: INTERNAL MEDICINE

## 2023-04-15 PROCEDURE — 93572 IV DOP VEL&/PRESS C FLO EA: CPT | Performed by: INTERNAL MEDICINE

## 2023-04-15 PROCEDURE — 4A033BC MEASUREMENT OF ARTERIAL PRESSURE, CORONARY, PERCUTANEOUS APPROACH: ICD-10-PCS | Performed by: INTERNAL MEDICINE

## 2023-04-15 PROCEDURE — C1760 CLOSURE DEV, VASC: HCPCS | Performed by: INTERNAL MEDICINE

## 2023-04-15 DEVICE — XIENCE SKYPOINT™ EVEROLIMUS ELUTING CORONARY STENT SYSTEM 2.25 MM X 28 MM / RAPID-EXCHANGE
Type: IMPLANTABLE DEVICE | Site: HEART | Status: FUNCTIONAL
Brand: XIENCE SKYPOINT™

## 2023-04-15 RX ORDER — LIDOCAINE HYDROCHLORIDE 10 MG/ML
INJECTION, SOLUTION INFILTRATION; PERINEURAL
Status: DISCONTINUED | OUTPATIENT
Start: 2023-04-15 | End: 2023-04-15 | Stop reason: HOSPADM

## 2023-04-15 RX ORDER — ONDANSETRON 2 MG/ML
4 INJECTION INTRAMUSCULAR; INTRAVENOUS EVERY 6 HOURS PRN
Status: DISCONTINUED | OUTPATIENT
Start: 2023-04-15 | End: 2023-04-16 | Stop reason: HOSPADM

## 2023-04-15 RX ORDER — SODIUM CHLORIDE 9 MG/ML
40 INJECTION, SOLUTION INTRAVENOUS AS NEEDED
Status: DISCONTINUED | OUTPATIENT
Start: 2023-04-15 | End: 2023-04-16 | Stop reason: HOSPADM

## 2023-04-15 RX ORDER — SODIUM CHLORIDE 0.9 % (FLUSH) 0.9 %
10 SYRINGE (ML) INJECTION EVERY 12 HOURS SCHEDULED
Status: CANCELLED | OUTPATIENT
Start: 2023-04-15

## 2023-04-15 RX ORDER — SODIUM CHLORIDE 9 MG/ML
125 INJECTION, SOLUTION INTRAVENOUS CONTINUOUS
Status: DISPENSED | OUTPATIENT
Start: 2023-04-15 | End: 2023-04-15

## 2023-04-15 RX ORDER — MIDAZOLAM HYDROCHLORIDE 1 MG/ML
INJECTION INTRAMUSCULAR; INTRAVENOUS
Status: DISCONTINUED | OUTPATIENT
Start: 2023-04-15 | End: 2023-04-15 | Stop reason: HOSPADM

## 2023-04-15 RX ORDER — SODIUM CHLORIDE 9 MG/ML
3 INJECTION, SOLUTION INTRAVENOUS CONTINUOUS
Status: DISCONTINUED | OUTPATIENT
Start: 2023-04-15 | End: 2023-04-15

## 2023-04-15 RX ORDER — SODIUM CHLORIDE 0.9 % (FLUSH) 0.9 %
10 SYRINGE (ML) INJECTION AS NEEDED
Status: DISCONTINUED | OUTPATIENT
Start: 2023-04-15 | End: 2023-04-16 | Stop reason: HOSPADM

## 2023-04-15 RX ORDER — HYDRALAZINE HYDROCHLORIDE 25 MG/1
25 TABLET, FILM COATED ORAL EVERY 8 HOURS SCHEDULED
Status: DISCONTINUED | OUTPATIENT
Start: 2023-04-15 | End: 2023-04-16 | Stop reason: HOSPADM

## 2023-04-15 RX ORDER — POLYETHYLENE GLYCOL 3350 17 G/17G
17 POWDER, FOR SOLUTION ORAL DAILY PRN
Status: DISCONTINUED | OUTPATIENT
Start: 2023-04-15 | End: 2023-04-16 | Stop reason: HOSPADM

## 2023-04-15 RX ORDER — BISACODYL 5 MG/1
5 TABLET, DELAYED RELEASE ORAL DAILY PRN
Status: DISCONTINUED | OUTPATIENT
Start: 2023-04-15 | End: 2023-04-15

## 2023-04-15 RX ORDER — AMLODIPINE BESYLATE 5 MG/1
10 TABLET ORAL DAILY
Status: DISCONTINUED | OUTPATIENT
Start: 2023-04-16 | End: 2023-04-16 | Stop reason: HOSPADM

## 2023-04-15 RX ORDER — ACETAMINOPHEN 325 MG/1
650 TABLET ORAL EVERY 4 HOURS PRN
Status: DISCONTINUED | OUTPATIENT
Start: 2023-04-15 | End: 2023-04-16 | Stop reason: HOSPADM

## 2023-04-15 RX ORDER — AMOXICILLIN 250 MG
2 CAPSULE ORAL 2 TIMES DAILY
Status: DISCONTINUED | OUTPATIENT
Start: 2023-04-15 | End: 2023-04-16 | Stop reason: HOSPADM

## 2023-04-15 RX ORDER — ONDANSETRON 4 MG/1
4 TABLET, FILM COATED ORAL EVERY 6 HOURS PRN
Status: DISCONTINUED | OUTPATIENT
Start: 2023-04-15 | End: 2023-04-16 | Stop reason: HOSPADM

## 2023-04-15 RX ORDER — FENTANYL CITRATE 50 UG/ML
INJECTION, SOLUTION INTRAMUSCULAR; INTRAVENOUS
Status: DISCONTINUED | OUTPATIENT
Start: 2023-04-15 | End: 2023-04-15 | Stop reason: HOSPADM

## 2023-04-15 RX ORDER — SODIUM CHLORIDE 0.9 % (FLUSH) 0.9 %
10 SYRINGE (ML) INJECTION AS NEEDED
Status: CANCELLED | OUTPATIENT
Start: 2023-04-15

## 2023-04-15 RX ORDER — SODIUM CHLORIDE 9 MG/ML
INJECTION, SOLUTION INTRAVENOUS
Status: DISCONTINUED | OUTPATIENT
Start: 2023-04-15 | End: 2023-04-15

## 2023-04-15 RX ORDER — SODIUM CHLORIDE 0.9 % (FLUSH) 0.9 %
10 SYRINGE (ML) INJECTION EVERY 12 HOURS SCHEDULED
Status: DISCONTINUED | OUTPATIENT
Start: 2023-04-15 | End: 2023-04-16 | Stop reason: HOSPADM

## 2023-04-15 RX ORDER — AMLODIPINE BESYLATE 5 MG/1
5 TABLET ORAL ONCE
Status: COMPLETED | OUTPATIENT
Start: 2023-04-15 | End: 2023-04-15

## 2023-04-15 RX ORDER — HEPARIN SODIUM 1000 [USP'U]/ML
INJECTION, SOLUTION INTRAVENOUS; SUBCUTANEOUS
Status: DISCONTINUED | OUTPATIENT
Start: 2023-04-15 | End: 2023-04-15 | Stop reason: HOSPADM

## 2023-04-15 RX ORDER — ACETAMINOPHEN 325 MG/1
650 TABLET ORAL EVERY 4 HOURS PRN
Status: DISCONTINUED | OUTPATIENT
Start: 2023-04-15 | End: 2023-04-15 | Stop reason: SDUPTHER

## 2023-04-15 RX ORDER — NITROGLYCERIN 5 MG/ML
INJECTION, SOLUTION INTRAVENOUS
Status: DISCONTINUED | OUTPATIENT
Start: 2023-04-15 | End: 2023-04-15 | Stop reason: HOSPADM

## 2023-04-15 RX ORDER — ASPIRIN 81 MG/1
81 TABLET ORAL DAILY
Status: DISCONTINUED | OUTPATIENT
Start: 2023-04-15 | End: 2023-04-16 | Stop reason: HOSPADM

## 2023-04-15 RX ORDER — BISACODYL 10 MG
10 SUPPOSITORY, RECTAL RECTAL DAILY PRN
Status: DISCONTINUED | OUTPATIENT
Start: 2023-04-15 | End: 2023-04-15

## 2023-04-15 RX ADMIN — Medication 10 ML: at 20:13

## 2023-04-15 RX ADMIN — GABAPENTIN 1200 MG: 400 CAPSULE ORAL at 15:10

## 2023-04-15 RX ADMIN — BUDESONIDE AND FORMOTEROL FUMARATE DIHYDRATE 2 PUFF: 160; 4.5 AEROSOL RESPIRATORY (INHALATION) at 20:04

## 2023-04-15 RX ADMIN — LEVETIRACETAM 1500 MG: 500 TABLET, FILM COATED ORAL at 20:12

## 2023-04-15 RX ADMIN — LISINOPRIL 10 MG: 5 TABLET ORAL at 08:57

## 2023-04-15 RX ADMIN — GABAPENTIN 1200 MG: 400 CAPSULE ORAL at 08:56

## 2023-04-15 RX ADMIN — NICOTINE 1 PATCH: 21 PATCH, EXTENDED RELEASE TRANSDERMAL at 20:13

## 2023-04-15 RX ADMIN — MELOXICAM 15 MG: 15 TABLET ORAL at 08:57

## 2023-04-15 RX ADMIN — DOCUSATE SODIUM AND SENNOSIDES 2 TABLET: 8.6; 5 TABLET, FILM COATED ORAL at 08:56

## 2023-04-15 RX ADMIN — IOPAMIDOL 100 ML: 755 INJECTION, SOLUTION INTRAVENOUS at 00:50

## 2023-04-15 RX ADMIN — BUDESONIDE AND FORMOTEROL FUMARATE DIHYDRATE 2 PUFF: 160; 4.5 AEROSOL RESPIRATORY (INHALATION) at 06:30

## 2023-04-15 RX ADMIN — AMLODIPINE BESYLATE 5 MG: 5 TABLET ORAL at 15:10

## 2023-04-15 RX ADMIN — LEVETIRACETAM 1500 MG: 500 TABLET, FILM COATED ORAL at 08:57

## 2023-04-15 RX ADMIN — AMLODIPINE BESYLATE 5 MG: 5 TABLET ORAL at 08:56

## 2023-04-15 RX ADMIN — DOCUSATE SODIUM AND SENNOSIDES 2 TABLET: 8.6; 5 TABLET, FILM COATED ORAL at 20:12

## 2023-04-15 RX ADMIN — Medication 10 ML: at 08:59

## 2023-04-15 RX ADMIN — ASPIRIN 81 MG: 81 TABLET, COATED ORAL at 10:47

## 2023-04-15 RX ADMIN — LISINOPRIL 10 MG: 5 TABLET ORAL at 20:12

## 2023-04-15 RX ADMIN — GABAPENTIN 1200 MG: 400 CAPSULE ORAL at 20:12

## 2023-04-15 RX ADMIN — HYDRALAZINE HYDROCHLORIDE 25 MG: 25 TABLET, FILM COATED ORAL at 21:57

## 2023-04-15 RX ADMIN — ATORVASTATIN CALCIUM 80 MG: 40 TABLET, FILM COATED ORAL at 20:12

## 2023-04-15 RX ADMIN — HYDRALAZINE HYDROCHLORIDE 25 MG: 25 TABLET, FILM COATED ORAL at 15:10

## 2023-04-15 RX ADMIN — SODIUM CHLORIDE 3 ML/KG/HR: 9 INJECTION, SOLUTION INTRAVENOUS at 13:50

## 2023-04-15 RX ADMIN — SODIUM CHLORIDE 125 ML/HR: 9 INJECTION, SOLUTION INTRAVENOUS at 15:11

## 2023-04-15 RX ADMIN — PANTOPRAZOLE SODIUM 40 MG: 40 TABLET, DELAYED RELEASE ORAL at 08:57

## 2023-04-15 NOTE — H&P
"[facility] H&P    Patient Name: Man Joseph  : 1960  MRN: [id number]  Primary Care Physician: Provider, No Known  Date of admission: 2023     Patient Care Team:  Provider, No Known as PCP - General          Subjective   History Present Illness     Chief Complaint:   Chief Complaint   Patient presents with   • Chest Pain     Chest Pain     Mr. Joseph is a 62 y.o.  presents to [facility] complaining of chest pain       History of Present Illness    ED 23: 61 y/o well appearing and talkative patient presents to room #23; states that he had chest pain last night that was relieved by taking his nitro, last night.  He states that he woke up this morning and wanted to take another nitro, but noticed that it was .  [facility] and [facility] told him [facility] come [facility].  Patient denies chest pain at this time.  He denies nausea or near syncope or dizziness.  He states that he had a cardiac stent placed 1-2 years ago.  He states that he had a stress test, but can't remember when, as it's been \"a long time time ago\".    OBS 4/15/23: Patient is a 62-year-old male presenting to the hospital with chest pain.  Patient states he took nitroglycerin last night at home and felt relief.  Patient states he woke up this morning and experienced chest pain again.  Patient denies nausea, vomiting, fever, syncope dizziness or edema.  Patient reports history of cardiac stents.  Patient states he normally sees cardiologist at [facility].    Review of Systems   Constitutional: Negative.   HENT: Negative.    Eyes: Negative.    Cardiovascular: Positive for chest pain.   Respiratory: Negative.    Endocrine: Negative.    Hematologic/Lymphatic: Negative.    Skin: Negative.    Musculoskeletal: Negative.    Gastrointestinal: Negative.    Genitourinary: Negative.    Neurological: Negative.    Psychiatric/Behavioral: Negative.    Allergic/Immunologic: Negative.            Personal History     Past Medical " "History:   Past Medical History:   Diagnosis Date   • Abdominal aortic aneurysm (AAA) without rupture 4/27/2022   • GONZALO (acute kidney injury) 4/27/2022   • Aortic aneurysm     midline    • Basal cell carcinoma     NOSE/RT EYE-DUCT   • Basal cell carcinoma (BCC) of skin of nose 4/27/2022    Nose and eyelid    • Cancer     RT KIDNEY   • Cardiac arrest     15YO    • Chest pain due to myocardial ischemia 4/27/2022   • COPD (chronic obstructive pulmonary disease)    • COPD (chronic obstructive pulmonary disease) 4/27/2022   • Essential hypertension 4/27/2022   • GERD (gastroesophageal reflux disease)    • GERD without esophagitis 4/27/2022   • History of chest pain    • Hyperkalemia 4/27/2022   • Hyperlipidemia    • Hypertension    • MI (myocardial infarction)    • Mixed hyperlipidemia 4/27/2022   • Mixed simple and mucopurulent chronic bronchitis 4/27/2022   • PTSD (post-traumatic stress disorder)    • Seizure    • Stroke     TIA-\"MINOR\"   • TBI (traumatic brain injury)     AGE 24   • Tinnitus        Surgical History:      Past Surgical History:   Procedure Laterality Date   • APPENDECTOMY      15YO   • CARPAL TUNNEL RELEASE Bilateral    • CERVICAL FUSION      C4-C5-C6   • CHALAZION EXCISION Right 12/21/2021    Procedure: RIGHT EXCISION OF BASAL CELL CARCINOMA WITH FROZEN SECTION;  Surgeon: Joshua Clemens MD;  Location: [facility];  Service: Ophthalmology;  Laterality: Right;   • CORONARY ANGIOPLASTY WITH STENT PLACEMENT     • HEAD/NECK LESION/CYST EXCISION Right 12/21/2021    Procedure: RIGHT MEDIAL CANTHUS REPAIR WITH ROTATIONAL FLAP PROBING OF RIGHT TEAR DUCT;  Surgeon: Joshua Clemens MD;  [place] [facility] [place] [facility] [place]  Service: Ophthalmology;  Laterality: Right;   • HERNIA REPAIR Left     x2   • KIDNEY SURGERY Right     PARTIAL NEPHRECTOMY   • SHOULDER ARTHROSCOPY Right    • SINUS SURGERY     • SKIN CANCER EXCISION  2019    BRIDGE OF NOSE    • TONSILLECTOMY      ADNOIDS           Family History: " family history is not on file. Otherwise pertinent FHx was reviewed and unremarkable.     Social History:  reports that he has been smoking cigarettes. He has a 50.00 pack-year smoking history. He has never used smokeless tobacco. He reports current alcohol use. He reports that he does not use drugs.      Medications:  Prior to Admission medications    Medication Sig Start Date End Date Taking? Authorizing Provider   amLODIPine (NORVASC) 5 MG tablet Take  by mouth Daily.   Yes Ismael Rock MD   atorvastatin (LIPITOR) 80 MG tablet Take 1 tablet by mouth Daily.   Yes Ismael Rock MD   Cholecalciferol 10 MCG (400 UNIT) capsule Take  by mouth. HOLDING FOR OR --12/7/21   Yes Ismael Rock MD   gabapentin (NEURONTIN) 400 MG capsule Take 3 capsules by mouth 3 (Three) Times a Day.   Yes Ismael Rock MD   latanoprost (XALATAN) 0.005 % ophthalmic solution Apply 1 drop to eye(s) as directed by provider.   Yes Ismael Rock MD   levETIRAcetam (KEPPRA) 750 MG tablet Take 2 tablets by mouth 2 (Two) Times a Day.   Yes Ismael Rock MD   lisinopril (PRINIVIL,ZESTRIL) 20 MG tablet 10 mg 2 (Two) Times a Day.   Yes Ismael Rock MD   Omega-3 Fatty Acids (fish oil) 1000 MG capsule capsule Take 1 capsule by mouth 3 (Three) Times a Day With Meals.   Yes Ismael Rock MD   pantoprazole (PROTONIX) 40 MG EC tablet Take 1 tablet by mouth.   Yes Ismael Rock MD   ALBUTEROL IN Inhale 90 mcg As Needed.    Ismael Rock MD   budesonide-formoterol (SYMBICORT) 160-4.5 MCG/ACT inhaler Inhale 2 puffs 2 (Two) Times a Day.    Ismael Rock MD   cyclobenzaprine (FLEXERIL) 10 MG tablet Take 1 tablet by mouth 2 (Two) Times a Day As Needed.    Ismael Rock MD   meloxicam (MOBIC) 15 MG tablet Take 1 tablet by mouth Daily. HOLDING FOR OR --12/7/21    Ismael Rock MD   tiotropium (SPIRIVA) 18 MCG per inhalation capsule Place 1 capsule into inhaler  and inhale Daily.    Provider, Ismael, MD       Allergies:    Allergies   Allergen Reactions   • Penicillins Rash       Objective   Objective     Vital Signs  Temp:  [97.3 °F (36.3 °C)-98.5 °F (36.9 °C)] 97.3 °F (36.3 °C)  Heart Rate:  [50-89] 55  Resp:  [16-22] 16  BP: (119-193)/() 127/83  SpO2:  [90 %-99 %] 90 %  on   ;   Device (Oxygen Therapy): room air  Body mass index is 31.71 kg/m².    Physical Exam  Vitals and nursing note reviewed.   Constitutional:       Appearance: Normal appearance.   HENT:      Head: Normocephalic and atraumatic.      Right Ear: External ear normal.      Left Ear: External ear normal.      Nose: Nose normal.      Mouth/Throat:      Mouth: Mucous membranes are moist.      Pharynx: Oropharynx is clear.   Eyes:      Extraocular Movements: Extraocular movements intact.      Conjunctiva/sclera: Conjunctivae normal.      Pupils: Pupils are equal, round, and reactive to light.   Cardiovascular:      Rate and Rhythm: Normal rate and regular rhythm.      Pulses: Normal pulses.      Heart sounds: Normal heart sounds.   Pulmonary:      Effort: Pulmonary effort is normal.      Breath sounds: Normal breath sounds.   Abdominal:      General: Bowel sounds are normal.   Musculoskeletal:         General: Normal range of motion.      Cervical back: Normal range of motion.   Skin:     General: Skin is warm.      Capillary Refill: Capillary refill takes less than 2 seconds.   Neurological:      General: No focal deficit present.      Mental Status: He is alert and oriented to person, place, and time.   Psychiatric:         Mood and Affect: Mood normal.         Behavior: Behavior normal.         Thought Content: Thought content normal.         Judgment: Judgment normal.           Results Review:  I have personally reviewed most recent cardiac tracings, lab results, microbiology results and radiology images and interpretations and agree with findings, most notably: CBC, CMP, troponin, BNP, CXR,  EKG.    Results from last 7 days   Lab Units 04/15/23  0623 04/14/23  1414 04/14/23  1145   WBC 10*3/mm3 8.80   < >  --    HEMOGLOBIN g/dL 15.5   < >  --    HEMATOCRIT % 46.5   < >  --    PLATELETS 10*3/mm3 244   < >  --    INR   --   --  1.02    < > = values in this interval not displayed.     Results from last 7 days   Lab Units 04/15/23  0623 04/14/23  2253 04/14/23  1757 04/14/23  1757 04/14/23  1656   SODIUM mmol/L 140  --    < > 141  --    POTASSIUM mmol/L 4.2  --    < > 4.4  --    CHLORIDE mmol/L 103  --    < > 102  --    CO2 mmol/L 25.0  --    < > 29.0  --    BUN mg/dL 9  --    < > 7*  --    CREATININE mg/dL 0.81  --    < > 0.82  --    GLUCOSE mg/dL 139*  --    < > 107*  --    CALCIUM mg/dL 9.4  --    < > 9.6  --    ALT (SGPT) U/L 67*  --    < > 70*  --    AST (SGOT) U/L 41*  --    < > 41*  --    HSTROP T ng/L  --  13  --  13  --    PROBNP pg/mL  --   --   --   --  36.2    < > = values in this interval not displayed.     Estimated Creatinine Clearance: 105.5 mL/min (by C-G formula based on SCr of 0.81 mg/dL).  Brief Urine Lab Results     None          Microbiology Results (last 10 days)     ** No results found for the last 240 hours. **          ECG/EMG Results (most recent)     Procedure Component Value Units Date/Time    ECG 12 Lead Chest Pain [[id number]] Collected: 04/14/23 1414     Updated: 04/14/23 1416     QT Interval 396 ms     Narrative:      HEART RATE= 56  bpm  RR Interval= 1076  ms  MS Interval= 165  ms  P Horizontal Axis= 6  deg  P Front Axis= 43  deg  QRSD Interval= 91  ms  QT Interval= 396  ms  QRS Axis= -6  deg  T Wave Axis= 100  deg  - OTHERWISE NORMAL ECG -  Sinus bradycardia  Electronically Signed By:   Date and Time of Study: 2023-04-14 14:14:41    ECG 12 Lead Chest Pain [[id number]] Collected: 04/14/23 2315     Updated: 04/14/23 2316     QT Interval 432 ms     Narrative:      HEART RATE= 61  bpm  RR Interval= 988  ms  MS Interval= 159  ms  P Horizontal Axis= 49  deg  P Front Axis= 39   deg  QRSD Interval= 157  ms  QT Interval= 432  ms  QRS Axis= -1  deg  T Wave Axis= 9  deg  - ABNORMAL ECG -  Sinus rhythm  Right bundle branch block  Inferior infarct, old  Electronically Signed By:   Date and Time of Study: 2023-04-14 23:15:35    ECG 12 Lead Chest Pain [[id number]] Collected: 04/15/23 0749     Updated: 04/15/23 0750     QT Interval 422 ms     Narrative:      HEART RATE= 55  bpm  RR Interval= 1096  ms  UT Interval= 174  ms  P Horizontal Axis= 17  deg  P Front Axis= 41  deg  QRSD Interval= 96  ms  QT Interval= 422  ms  QRS Axis= 0  deg  T Wave Axis= 150  deg  - OTHERWISE NORMAL ECG -  Sinus bradycardia  Electronically Signed By:   Date and Time of Study: 2023-04-15 07:49:17              Results for orders placed during the hospital encounter of 04/27/22    Adult Transthoracic Echo Complete W/ Cont if Necessary Per Protocol    Interpretation Summary  · Estimated left ventricular EF = 60% Left ventricular systolic function is normal.    Indication  Shortness of breath    Technically satisfactory study.  Mitral valve is structurally normal.  Tricuspid valve is structurally normal.  Aortic valve is thickened with adequate opening motion.  Pulmonic valve could not be well visualized.  No evidence for mitral tricuspid or aortic regurgitation is seen by Doppler study.  Left atrium is normal in size.  Right atrium is normal in size.  Left ventricle is normal in size and contractility with ejection fraction of 60%.  Right ventricle is normal in size.  Atrial septum is intact.  Aorta is normal.  No pericardial effusion or intracardiac thrombus is seen.    Impression  Structurally and functionally normal cardiac valves except thickened aortic valve with adequate opening motion..  Left ventricular size and contractility is normal with ejection fraction of 60%.      XR Chest 1 View    Result Date: 4/14/2023  Impression: No acute cardiopulmonary findings. Electronically Signed: Noble Little  4/14/2023 3:27 PM EDT   Workstation ID: [id number]    CT Angiogram Chest Pulmonary Embolism    Result Date: 4/15/2023  1. No evidence of pulmonary embolism. 2. No evidence of thoracic aortic aneurysm or dissection. 3. No evidence of pneumonia, pleural or pericardial effusions. 4. Subtle nodular contour to liver is suggestive of cirrhosis. 5. Mild emphysema. Electronically signed by:  Abiel Rutledge D.O.  4/14/2023 11:04 PM Mountain Time        Estimated Creatinine Clearance: 105.5 mL/min (by C-G formula based on SCr of 0.81 mg/dL).    Assessment & Plan   Assessment/Plan       Active Hospital Problems    Diagnosis  POA   • **Chest pain, unspecified type [R07.9]  Yes   • Chest pain [R07.9]  Yes      Resolved Hospital Problems   No resolved problems to display.     Chest Pain   - CXR: No acute cardiopulmonary findings  - EKG: Sinus bradycardia with heart rate 55  - Troponin 13 with repeat of 13  - BNP 36.2  - Continue cardiac monitoring   -D-dimer elevated with CT PE protocol showed no evidence of pulmonary embolism, aortic aneurysm or dissection, pneumonia pericardial effusion, nodular contour of the liver suggesting cirrhosis, mild emphysema   - NPO after midnight   -Stress test (4-22): No ischemia  -Echocardiogram (4-22): EF of 60% with normal valvular structure  - Continue NTG SL PRN for CP if systolic bp above 90mmHg  - Continue aspirin  - Cardiology consult for cardiac catheterization  -Consult hospitalist for medical management due to her level cutie post catheterization     Chronic essential hypertension  -Continue amlodipine and lisinopril    Seizure disorder  -Continue Keppra  -Seizure precautions  -Last known seizure activity unknown    COPD  -Continue home bronchodilators  -CT chest: Mild for Shankar    Hyperlipidemia  -Continue statin  -Total cholesterol 330, HDL 27, , triglycerides 603    GERD  -Continue PPI    Chronic back pain  -Continue Flexeril gabapentin  -[facility]    Elevated LFTs  -ALT 67,  AST 41, alkaline phosphate 113, monitor trend  -CT chest:  nodular contour of the liver suggesting cirrhosis  -Strict I's and O's  -Daily weights    Tobacco abuse  -Nicotine patch  -smoking cessation encouraged    Obesity (BMI 31.71)  -Lifestyle modifications encouraged    VTE Prophylaxis -   Mechanical Order History:      Ordered        04/15/23 0810  Place Sequential Compression Device  Once            04/15/23 0810  Maintain Sequential Compression Device  Continuous                    Pharmalogical Order History:      Ordered     Dose Route Frequency Stop    04/15/23 1145  heparin (porcine) injection  Status:  Discontinued         -- -- Code / Trauma / Sedation Medication 04/15/23 1237                CODE STATUS:    Code Status and Medical Interventions:   Ordered at: 04/15/23 1242     Level Of Support Discussed With:    Patient     Code Status (Patient has no pulse and is not breathing):    CPR (Attempt to Resuscitate)     Medical Interventions (Patient has pulse or is breathing):    Full Support     Release to patient:    Routine Release       This patient has been examined wearing personal protective equipment.     I discussed the patient's findings and my recommendations with patient, family, nursing staff, primary care team and consulting provider.      Signature:Electronically signed by RADHA Davis, 04/15/23, 1:03 PM EDT.'        I spent 45 minutes caring for Man on this date of service. This time includes time spent by me in the following activities: reviewing tests, obtaining and/or reviewing a separately obtained history, performing a medically appropriate examination and/or evaluation, counseling and educating the patient/family/caregiver, ordering medications, tests, or procedures, referring and communicating with other health care professionals, documenting information in the medical record, independently interpreting results and communicating that information with the patient/family/caregiver  and care coordination.

## 2023-04-15 NOTE — NURSING NOTE
Pt anxious about being in hospital. Angry about not getting meds from home.  3 more attempts made to get labs drawn. Labs sent to lab. Pt very anxious about numerous lab sticks.

## 2023-04-15 NOTE — ED NOTES
"Nursing report ED to floor  Man Joseph  62 y.o.  male    HPI:   Chief Complaint   Patient presents with   • Chest Pain       Admitting doctor:   Gabriel Packer MD    Admitting diagnosis:   The encounter diagnosis was Chest pain, unspecified type.    Code status:   Current Code Status     Date Active Code Status Order ID Comments User Context       Prior          Allergies:   Penicillins    Isolation:  No active isolations     Fall Risk:  Fall Risk Assessment was completed, and patient is at low risk for falls.   Predictive Model Details         11 (Low) Factor Value    Calculated 4/14/2023 18:06 Age 62    Risk of Fall Model Musculoskeletal Assessment WDL     Active Peripheral IV Present     Respiratory Rate 20     Skin Assessment WDL     Magnesium not on file     Drug Use No     Tobacco Use Current     Dwight Scale not on file     Financial Class Private Insurance     Peripheral Vascular Assessment WDL     Cardiac Assessment X     Calcium not on file     Clinically Relevant Sex Not Female     Gastrointestinal Assessment WDL     Albumin not on file     Diastolic      Total Bilirubin not on file     Days after Admission 0.102     Chloride not on file     Potassium not on file     Creatinine not on file     ALT not on file         Weight:       04/14/23  1402   Weight: 97.2 kg (214 lb 4.6 oz)       Intake and Output  No intake or output data in the 24 hours ending 04/14/23 2007    Diet:        Most recent vitals:   Vitals:    04/14/23 1402 04/14/23 1655 04/14/23 1802 04/14/23 1905   BP:  (!) 172/109 (!) 193/109 170/73   Pulse:  56 50 70   Resp:  20 20 20   Temp:       SpO2:  96% 97% 94%   Weight: 97.2 kg (214 lb 4.6 oz)      Height: 172.7 cm (68\")          Active LDAs/IV Access:   Lines, Drains & Airways     Active LDAs     Name Placement date Placement time Site Days    Peripheral IV 04/14/23 1656 Anterior;Left Forearm 04/14/23 1656  Forearm  less than 1                Skin Condition:   Skin Assessments " (last day)     None           Labs (abnormal labs have a star):   Labs Reviewed   COMPREHENSIVE METABOLIC PANEL - Abnormal; Notable for the following components:       Result Value    Glucose 107 (*)     BUN 7 (*)     ALT (SGPT) 70 (*)     AST (SGOT) 41 (*)     All other components within normal limits    Narrative:     GFR Normal >60  Chronic Kidney Disease <60  Kidney Failure <15     CBC WITH AUTO DIFFERENTIAL - Abnormal; Notable for the following components:    Eosinophil % 8.3 (*)     Eosinophils, Absolute 0.80 (*)     All other components within normal limits   CBC WITH AUTO DIFFERENTIAL - Abnormal; Notable for the following components:    RBC 3.86 (*)     Hemoglobin 12.0 (*)     Hematocrit 36.1 (*)     Eosinophil % 7.6 (*)     Eosinophils, Absolute 0.50 (*)     All other components within normal limits   TROPONIN - Normal    Narrative:     High Sensitive Troponin T Reference Range:  <10.0 ng/L- Negative Female for AMI  <15.0 ng/L- Negative Male for AMI  >=10 - Abnormal Female indicating possible myocardial injury.  >=15 - Abnormal Male indicating possible myocardial injury.   Clinicians would have to utilize clinical acumen, EKG, Troponin, and serial changes to determine if it is an Acute Myocardial Infarction or myocardial injury due to an underlying chronic condition.        BNP (IN-HOUSE) - Normal    Narrative:     Among patients with dyspnea, NT-proBNP is highly sensitive for the detection of acute congestive heart failure. In addition NT-proBNP of <300 pg/ml effectively rules out acute congestive heart failure with 99% negative predictive value.    Results may be falsely decreased if patient taking Biotin.     PROTIME-INR - Normal   SCAN SLIDE   D-DIMER, QUANTITATIVE   HIGH SENSITIVITIY TROPONIN T 2HR   CBC AND DIFFERENTIAL    Narrative:     The following orders were created for panel order CBC & Differential.  Procedure                               Abnormality         Status                      ---------                               -----------         ------                     CBC Auto Differential[[id number]]        Abnormal            Final result               Scan Slide[[id number]]                                       Final result                 Please view results for these tests on the individual orders.   CBC AND DIFFERENTIAL    Narrative:     The following orders were created for panel order CBC & Differential.  Procedure                               Abnormality         Status                     ---------                               -----------         ------                     CBC Auto Differential[[id number]]        Abnormal            Final result                 Please view results for these tests on the individual orders.       LOC: Person, Place, Time and Situation    Telemetry:  Telemetry    Cardiac Monitoring Ordered: yes    EKG:   ECG 12 Lead Chest Pain   Preliminary Result   HEART RATE= 56  bpm   RR Interval= 1076  ms   IA Interval= 165  ms   P Horizontal Axis= 6  deg   P Front Axis= 43  deg   QRSD Interval= 91  ms   QT Interval= 396  ms   QRS Axis= -6  deg   T Wave Axis= 100  deg   - OTHERWISE NORMAL ECG -   Sinus bradycardia   Electronically Signed By:    Date and Time of Study: 2023-04-14 14:14:41          Medications Given in the ED:   Medications   sodium chloride 0.9 % flush 10 mL (has no administration in time range)   hydrALAZINE (APRESOLINE) injection 5 mg (5 mg Intravenous Given 4/14/23 1820)       Imaging results:  XR Chest 1 View    Result Date: 4/14/2023  Impression: No acute cardiopulmonary findings. Electronically Signed: Noble Little  4/14/2023 3:27 PM EDT  Workstation ID: [id number]      Social issues:   Social History     Socioeconomic History   • Marital status:    Tobacco Use   • Smoking status: Every Day     Packs/day: 1.00     Years: 50.00     Pack years: 50.00     Types: Cigarettes   • Smokeless tobacco: Never   • Tobacco comments:     SINCE 14 YO    Vaping Use   • Vaping Use: Never used   Substance and Sexual Activity   • Alcohol use: Yes     Comment: RARE   • Drug use: Never   • Sexual activity: Defer       NIH Stroke Scale:  Interval: (not recorded)  1a. Level of Consciousness: (not recorded)  1b. LOC Questions: (not recorded)  1c. LOC Commands: (not recorded)  2. Best Gaze: (not recorded)  3. Visual: (not recorded)  4. Facial Palsy: (not recorded)  5a. Motor Arm, Left: (not recorded)  5b. Motor Arm, Right: (not recorded)  6a. Motor Leg, Left: (not recorded)  6b. Motor Leg, Right: (not recorded)  7. Limb Ataxia: (not recorded)  8. Sensory: (not recorded)  9. Best Language: (not recorded)  10. Dysarthria: (not recorded)  11. Extinction and Inattention (formerly Neglect): (not recorded)    Total (NIH Stroke Scale): (not recorded)     Additional notable assessment information:     Nursing report ED to floor:  MOUNIKA Marie RN   04/14/23 20:07 EDT

## 2023-04-15 NOTE — CONSULTS
"[facility] [place]        Subjective:     Encounter Date:04/14/2023      Patient ID: Man Joseph is a 62 y.o. male.    Chief Complaint: chest pain    Referring Physician: Nellie GARCIA    HPI:  Man Joseph is a 62 y.o. male who presents with chest pain. [facility] has [facility] Albert at [facility]. Pmh includes CAD s/p RCA stenting in 2020 per prior documentation, AAA,COPD, HTN, HLD, GERD. CVA, seizure disorder. Nuclear stress test 4/2022 showing no ischemia. 4/2022 normal ECHO.    Mr. Joseph reports he has been experiencing chest pain for 30 years. He states he \"always\" has chest pain. He developed left sided chest pain described as heaviness relieved by sublingual nitroglycerine. He states he developed pain again yesterday morning and presented to ER.     Work up reveals normal cardiac enzymes. Sinus bradycardia with non specific ST T wave changes. Primary team originally ordered a stress test however he had a stress test last year.       Past Medical History:   Diagnosis Date   • Abdominal aortic aneurysm (AAA) without rupture 4/27/2022   • GONZALO (acute kidney injury) 4/27/2022   • Aortic aneurysm     midline    • Basal cell carcinoma     NOSE/RT EYE-DUCT   • Basal cell carcinoma (BCC) of skin of nose 4/27/2022    Nose and eyelid    • Cancer     RT KIDNEY   • Cardiac arrest     15YO    • Chest pain due to myocardial ischemia 4/27/2022   • COPD (chronic obstructive pulmonary disease)    • COPD (chronic obstructive pulmonary disease) 4/27/2022   • Essential hypertension 4/27/2022   • GERD (gastroesophageal reflux disease)    • GERD without esophagitis 4/27/2022   • History of chest pain    • Hyperkalemia 4/27/2022   • Hyperlipidemia    • Hypertension    • MI (myocardial infarction)    • Mixed hyperlipidemia 4/27/2022   • Mixed simple and mucopurulent chronic bronchitis 4/27/2022   • PTSD (post-traumatic stress disorder)    • Seizure    • Stroke     TIA-\"MINOR\"   • " TBI (traumatic brain injury)     AGE 24   • Tinnitus        Past Surgical History:   Procedure Laterality Date   • APPENDECTOMY      15YO   • CARPAL TUNNEL RELEASE Bilateral    • CERVICAL FUSION      C4-C5-C6   • CHALAZION EXCISION Right 12/21/2021    Procedure: RIGHT EXCISION OF BASAL CELL CARCINOMA WITH FROZEN SECTION;  Surgeon: Joshua Clemens MD;  Location: [facility] OR [facility] [place];  Service: Ophthalmology;  Laterality: Right;   • CORONARY ANGIOPLASTY WITH STENT PLACEMENT     • HEAD/NECK LESION/CYST EXCISION Right 12/21/2021    Procedure: RIGHT MEDIAL CANTHUS REPAIR WITH ROTATIONAL FLAP PROBING OF RIGHT TEAR DUCT;  Surgeon: Joshua Clemens MD;  Location: [facility] OR [facility] [place];  Service: Ophthalmology;  Laterality: Right;   • HERNIA REPAIR Left     x2   • KIDNEY SURGERY Right     PARTIAL NEPHRECTOMY   • SHOULDER ARTHROSCOPY Right    • SINUS SURGERY     • SKIN CANCER EXCISION  2019    BRIDGE OF NOSE    • TONSILLECTOMY      ADNOIDS       Family History   Problem Relation Age of Onset   • Malig Hyperthermia Neg Hx        Social History     Socioeconomic History   • Marital status:    Tobacco Use   • Smoking status: Every Day     Packs/day: 1.00     Years: 50.00     Pack years: 50.00     Types: Cigarettes   • Smokeless tobacco: Never   • Tobacco comments:     SINCE 14 YO   Vaping Use   • Vaping Use: Never used   Substance and Sexual Activity   • Alcohol use: Yes     Comment: RARE   • Drug use: Never   • Sexual activity: Defer         Allergies   Allergen Reactions   • Penicillins Rash       Current Medications:   Scheduled Meds:amLODIPine, 5 mg, Oral, Daily  amLODIPine, 5 mg, Oral, Once  atorvastatin, 80 mg, Oral, Nightly  budesonide-formoterol, 2 puff, Inhalation, BID - RT  gabapentin, 1,200 mg, Oral, TID  latanoprost, 1 drop, Both Eyes, Nightly  levETIRAcetam, 1,500 mg, Oral, BID  lisinopril, 10 mg, Oral, Q12H  meloxicam, 15 mg, Oral, Daily  nicotine, 1 patch, Transdermal, Nightly  pantoprazole, 40 mg, Oral,  "Daily  senna-docusate sodium, 2 tablet, Oral, BID  sodium chloride, 10 mL, Intravenous, Q12H  sodium chloride, 10 mL, Intravenous, Q12H      Continuous Infusions:     Review of Systems   Constitutional: Negative for chills, diaphoresis and malaise/fatigue.   Cardiovascular: Positive for chest pain. Negative for dyspnea on exertion, irregular heartbeat, leg swelling, near-syncope, orthopnea, palpitations, paroxysmal nocturnal dyspnea and syncope.   Respiratory: Negative for cough, shortness of breath, sleep disturbances due to breathing and sputum production.    Gastrointestinal: Negative for change in bowel habit.   Genitourinary: Negative for urgency.   Neurological: Negative for dizziness and headaches.   Psychiatric/Behavioral: Negative for altered mental status.            Objective:         /98   Pulse 89   Temp 98.4 °F (36.9 °C) (Oral)   Resp 18   Ht 172.7 cm (68\")   Wt 94.1 kg (207 lb 7.3 oz)   SpO2 98%   BMI 31.54 kg/m²     Physical Exam:  General Appearance:    Alert, cooperative, in no acute distress                                Head: Atraumatic, normocephalic, PERRLA               Neck:   supple, trachea midline, no thyromegaly, no carotid bruit, no JVD   Lungs:     Clear to auscultation,respirations regular, even and               unlabored    Heart:    Regular rhythm and normal rate, normal S1 and S2, no       murmur, no gallop, no rub, no click   Abdomen:     Normal bowel sounds, no masses, no organomegaly, soft  nontender, nondistended, no guarding, no rebound  tenderness   Extremities:   Moves all extremities well, no edema, no cyanosis, no  redness   Pulses:   Pulses palpable and equal bilaterally   Skin:   No bleeding, bruising or rash   Neurologic:   Awake, alert, oriented x3                 ASCVD Risk Score::  The ASCVD Risk score (Cathi DK, et al., 2019) failed to calculate for the following reasons:    The valid total cholesterol range is 130 to 320 mg/dL      Lab Review: "     Results from last 7 days   Lab Units 04/15/23  0623 04/14/23  1757   SODIUM mmol/L 140 141   POTASSIUM mmol/L 4.2 4.4   CHLORIDE mmol/L 103 102   CO2 mmol/L 25.0 29.0   BUN mg/dL 9 7*   CREATININE mg/dL 0.81 0.82   GLUCOSE mg/dL 139* 107*   CALCIUM mg/dL 9.4 9.6   AST (SGOT) U/L 41* 41*   ALT (SGPT) U/L 67* 70*     Results from last 7 days   Lab Units 04/14/23  2253 04/14/23  1757   HSTROP T ng/L 13 13     Results from last 7 days   Lab Units 04/15/23  0623 04/14/23  1656   WBC 10*3/mm3 8.80 6.30   HEMOGLOBIN g/dL 15.5 12.0*   HEMATOCRIT % 46.5 36.1*   PLATELETS 10*3/mm3 244 172     Results from last 7 days   Lab Units 04/14/23  1145   INR  1.02     Results from last 7 days   Lab Units 04/15/23  0623   MAGNESIUM mg/dL 1.7           Invalid input(s): LDLCALC  Results from last 7 days   Lab Units 04/14/23  1656   PROBNP pg/mL 36.2     Results from last 7 days   Lab Units 04/15/23  0623   TSH uIU/mL 2.720       Recent Radiology:  Imaging Results (Most Recent)     Procedure Component Value Units Date/Time    CT Angiogram Chest Pulmonary Embolism [[id number]] Collected: 04/14/23 2302     Updated: 04/15/23 0105    Narrative:      Exam: CT Angiography of the Chest.    Date: 4/15/2023.     Comparison: None    History: Elevated d-dimer with chest pain.    Technique: CT examination of the chest was performed following the intravenous administration of 100 mL of Isovue-370. Sagittal, coronal and 3-D reformatted images were provided. CT dose lowering techniques were used, to include: automated exposure   control, adjustment for patient size, and/or use of iterative reconstruction.    FINDINGS:    Mediastinum and Cora: There is no axillary, mediastinal or hilar lymphadenopathy.    Pleural and Pericardial spaces: There are no pleural or pericardial effusions.    Upper Abdomen: Subtle nodular contour to the liver suspicious for cirrhosis. There are multiple calcified granulomas within the spleen. The visualized upper abdomen  otherwise appears unremarkable.    Cardiovascular: The thoracic aorta is normal in size without evidence of aneurysm or dissection.    Pulmonary Artery: There are no filling defects in the pulmonary arteries.    Lung Parenchyma and Airways: There is mild upper lobe predominant centrilobular and paraseptal emphysema. The lungs otherwise appear clear.    Bones: No fracture or aggressive osseous lesion.      Impression:        1. No evidence of pulmonary embolism.  2. No evidence of thoracic aortic aneurysm or dissection.  3. No evidence of pneumonia, pleural or pericardial effusions.  4. Subtle nodular contour to liver is suggestive of cirrhosis.  5. Mild emphysema.      Electronically signed by:  Abiel Rutledge D.O.    4/14/2023 11:04 PM Mountain Time    XR Chest 1 View [[id number]] Collected: 04/14/23 1526     Updated: 04/14/23 1529    Narrative:      XR CHEST 1 VW    Date of Exam: 4/14/2023 3:21 PM EDT    Indication: CHF/COPD Protocol  CHF/COPD Protocol.    Comparison: 4/27/2022    Findings:  Cervical fixation hardware is noted.    Probable calcified granulomatous changes in the left lung base. No focal pulmonary consolidations are evident. Evaluation of the bases somewhat limited due to overlying soft tissue. No pneumothorax is seen. No pleural fluid is evident. Heart size and   mediastinal contour appear within normal limits. Pulmonary vascularity appears unchanged.      Impression:      Impression:  No acute cardiopulmonary findings.    Electronically Signed: Noble Little    4/14/2023 3:27 PM EDT    Workstation ID: [id number]            ECHOCARDIOGRAM:    Results for orders placed during the hospital encounter of 04/27/22    Adult Transthoracic Echo Complete W/ Cont if Necessary Per Protocol    Interpretation Summary  · Estimated left ventricular EF = 60% Left ventricular systolic function is normal.    Indication  Shortness of breath    Technically satisfactory study.  Mitral valve is structurally  normal.  Tricuspid valve is structurally normal.  Aortic valve is thickened with adequate opening motion.  Pulmonic valve could not be well visualized.  No evidence for mitral tricuspid or aortic regurgitation is seen by Doppler study.  Left atrium is normal in size.  Right atrium is normal in size.  Left ventricle is normal in size and contractility with ejection fraction of 60%.  Right ventricle is normal in size.  Atrial septum is intact.  Aorta is normal.  No pericardial effusion or intracardiac thrombus is seen.    Impression  Structurally and functionally normal cardiac valves except thickened aortic valve with adequate opening motion..  Left ventricular size and contractility is normal with ejection fraction of 60%.                Assessment:         Active Hospital Problems    Diagnosis  POA   • **Chest pain, unspecified type [R07.9]  Yes   • Chest pain [R07.9]  Yes     1. Chest Pain concerning for angina  - relieved with SL nitro  - stress test normal last year    2. CAD s/p RCA stenting in 2020 per prior documentation,    3. AAA    4. COPD    5. HTN    6.  HLD    7. GERD    8. CVA    9. seizure disorder       Plan:   Patient presents with chest pain, has known CAD s/p PCI to RCA in past  He had normal stress test last year  He presents with chest pain relieved by SL nitro  Proceed with cardiac cath                 RADHA Burch  04/15/23  09:12 EDT

## 2023-04-15 NOTE — NURSING NOTE
Dr Batres notified that pt may have a myoview in the am, Md would not change order  to vq scan. Stated he still wanted the ct scan for pe protocol

## 2023-04-15 NOTE — PLAN OF CARE
Problem: Adult Inpatient Plan of Care  Goal: Plan of Care Review  Outcome: Ongoing, Progressing  Flowsheets (Taken 4/14/2023 2356)  Plan of Care Reviewed With: patient  Outcome Evaluation: admitted with chest pain. ched for myoview in am  Goal: Patient-Specific Goal (Individualized)  Outcome: Ongoing, Progressing  Goal: Absence of Hospital-Acquired Illness or Injury  Outcome: Ongoing, Progressing  Intervention: Identify and Manage Fall Risk  Recent Flowsheet Documentation  Taken 4/14/2023 2030 by Cynthia Farrar RN  Safety Promotion/Fall Prevention:   safety round/check completed   nonskid shoes/slippers when out of bed   lighting adjusted  Intervention: Prevent and Manage VTE (Venous Thromboembolism) Risk  Recent Flowsheet Documentation  Taken 4/14/2023 2030 by Cynthia Farrar RN  Activity Management: ambulated to bathroom  Intervention: Prevent Infection  Recent Flowsheet Documentation  Taken 4/14/2023 2030 by Cynthia Farrar RN  Infection Prevention:   rest/sleep promoted   single patient room provided  Goal: Optimal Comfort and Wellbeing  Outcome: Ongoing, Progressing  Intervention: Provide Person-Centered Care  Recent Flowsheet Documentation  Taken 4/14/2023 2030 by Cynthia Farrar RN  Trust Relationship/Rapport:   questions answered   reassurance provided   thoughts/feelings acknowledged  Goal: Readiness for Transition of Care  Outcome: Ongoing, Progressing  Intervention: Mutually Develop Transition Plan  Recent Flowsheet Documentation  Taken 4/14/2023 2042 by Cynthia Farrar RN  Transportation Anticipated: family or friend will provide  Patient/Family Anticipated Services at Transition: none  Patient/Family Anticipates Transition to: home  Taken 4/14/2023 2041 by Cyntiha Farrar RN  Equipment Currently Used at Home: none  Taken 4/14/2023 2038 by Cynthia Farrar RN  Equipment Currently Used at Home: bp cuff     Problem: Chest Pain  Goal: Resolution of Chest Pain Symptoms  Outcome:  Ongoing, Progressing   Goal Outcome Evaluation:  Plan of Care Reviewed With: patient           Outcome Evaluation: admitted with chest pain. ched for myoview in am

## 2023-04-15 NOTE — CONSULTS
[facility] Medicine Services   Consult Note    Patient Name: Man Joseph  : 1960  MRN: [id number]  Primary Care Physician:  Provider, No Known  Referring Physician: No ref. provider found  Date of admission: 2023  Date and Time of Care: 4/15/2023 at 1830    Consults    Subjective      Reason for Consult/ Chief Complaint: Chest pain    Consult Requested By: Dr. Vasquez, cardiology    History of Present Illness: Man Joseph is a 62 y.o. male with CAD status post RCA PCI , COPD, hypertension, hyperlipidemia, GERD, CVA, seizure disorder, abdominal aortic aneurysm that presented to the emergency department with complaints of chest pain.  States he had chest pain last night that was relieved with taking nitroglycerin, went to take another nitro this morning and noticed it was , [facility] who told him to come to the emergency department.  CTA chest to rule out pulmonary embolism, no PE and no thoracic aneurysm.  Patient had a normal stress test approximately 1 year ago, with his symptomology he underwent cardiac cath today and subsequent PCI to LAD.  We have been consulted for medical management during his hospitalization.    Review of Systems   Constitutional: Negative for diaphoresis, malaise/fatigue, weight gain and weight loss.   Cardiovascular: Negative for chest pain (resolved), dyspnea on exertion, leg swelling, near-syncope, orthopnea, palpitations and syncope.   Respiratory: Negative for hemoptysis, shortness of breath, sputum production and wheezing.    Skin: Negative for rash.   Gastrointestinal: Negative for abdominal pain, hematemesis, hematochezia, nausea and vomiting.   Neurological: Negative for dizziness, light-headedness, numbness and seizures.   Psychiatric/Behavioral: Negative.    All other systems reviewed and are negative.       Personal History     Past Medical History:   Diagnosis Date   • Abdominal aortic aneurysm (AAA) without rupture 2022   • GONZALO  "(acute kidney injury) 4/27/2022   • Aortic aneurysm     midline    • Basal cell carcinoma     NOSE/RT EYE-DUCT   • Basal cell carcinoma (BCC) of skin of nose 4/27/2022    Nose and eyelid    • Cancer     RT KIDNEY   • Cardiac arrest     15YO    • Chest pain due to myocardial ischemia 4/27/2022   • COPD (chronic obstructive pulmonary disease)    • COPD (chronic obstructive pulmonary disease) 4/27/2022   • Essential hypertension 4/27/2022   • GERD (gastroesophageal reflux disease)    • GERD without esophagitis 4/27/2022   • History of chest pain    • Hyperkalemia 4/27/2022   • Hyperlipidemia    • Hypertension    • MI (myocardial infarction)    • Mixed hyperlipidemia 4/27/2022   • Mixed simple and mucopurulent chronic bronchitis 4/27/2022   • PTSD (post-traumatic stress disorder)    • Seizure    • Stroke     TIA-\"MINOR\"   • TBI (traumatic brain injury)     AGE 24   • Tinnitus        Past Surgical History:   Procedure Laterality Date   • APPENDECTOMY      15YO   • CARPAL TUNNEL RELEASE Bilateral    • CERVICAL FUSION      C4-C5-C6   • CHALAZION EXCISION Right 12/21/2021    Procedure: RIGHT EXCISION OF BASAL CELL CARCINOMA WITH FROZEN SECTION;  Surgeon: Joshua Clemens MD;  [place] [facility] [place] [facility] [place]  Service: Ophthalmology;  Laterality: Right;   • CORONARY ANGIOPLASTY WITH STENT PLACEMENT     • HEAD/NECK LESION/CYST EXCISION Right 12/21/2021    Procedure: RIGHT MEDIAL CANTHUS REPAIR WITH ROTATIONAL FLAP PROBING OF RIGHT TEAR DUCT;  Surgeon: Joshua Clemens MD;  [place] [facility] [place] [facility] [place];  Service: Ophthalmology;  Laterality: Right;   • HERNIA REPAIR Left     x2   • KIDNEY SURGERY Right     PARTIAL NEPHRECTOMY   • SHOULDER ARTHROSCOPY Right    • SINUS SURGERY     • SKIN CANCER EXCISION  2019    BRIDGE OF NOSE    • TONSILLECTOMY      ADNOIDS       Family History: family history is not on file. Otherwise pertinent FHx was reviewed and not pertinent to current issue.    Social History:  reports that he has been " smoking cigarettes. He has a 50.00 pack-year smoking history. He has never used smokeless tobacco. He reports current alcohol use. He reports that he does not use drugs.    Home Medications:   Albuterol, Cholecalciferol, amLODIPine, atorvastatin, budesonide-formoterol, cyclobenzaprine, fish oil, gabapentin, latanoprost, levETIRAcetam, lisinopril, meloxicam, pantoprazole, and tiotropium    Allergies:  Allergies   Allergen Reactions   • Penicillins Rash         Objective      Vitals:  Temp:  [97.3 °F (36.3 °C)-98.5 °F (36.9 °C)] 97.6 °F (36.4 °C)  Heart Rate:  [48-89] 53  Resp:  [16-20] 16  BP: (119-187)/() 143/100    Physical Exam  Cardiovascular:      Rate and Rhythm: Normal rate and regular rhythm.      Pulses: Normal pulses.      Heart sounds: No murmur heard.  Pulmonary:      Effort: Pulmonary effort is normal.      Breath sounds: Normal breath sounds. No wheezing or rales.   Abdominal:      General: There is no distension.   Musculoskeletal:      Right lower leg: No edema.      Left lower leg: No edema.   Skin:     General: Skin is warm and dry.   Neurological:      Mental Status: He is alert and oriented to person, place, and time.   Psychiatric:         Mood and Affect: Mood normal.          Result Review    Result Review:  I have personally reviewed the results from the time of this admission to 4/15/2023 18:22 EDT and agree with these findings:  [x]  Laboratory  []  Microbiology  [x]  Radiology  [x]  EKG/Telemetry   [x]  Cardiology/Vascular   []  Pathology  []  Old records  []  Other:  Most notable findings include: See summary and plan (and as above in HPI)      Assessment & Plan        Active Hospital Problems:  Active Hospital Problems    Diagnosis    • **Chest pain, unspecified type    • Chest pain      Plan:   #Chest pain  #CAD, previous PCI RCA 2020  -Underwent cardiac cath and PCI to LAD, normal LV function  -Brilinta initiated, plan for DAPT 6 months then Plavix monotherapy  -Unable to initiate  beta-blocker with bradycardia  -Anticipate discharge tomorrow if stable overnight    #HTN  -Continue lisinopril, Norvasc, Apresoline  -Currently controlled on current therapy    #Hyperlipidemia  -Statin    #COPD  #Tobacco abuse  -Not in exacerbation  -Continue Symbicort  -Nicotine patch    #GERD  -PPI    #Previous CVA, seizure disorder  -Continue home meds    #AAA  -Follow with vascular surgery as outpatient    I discussed the patient's findings and my recommendations with patient.    Patient examined using appropriate personal protective equipment  Signature: Electronically signed by RADHA Fenton, 04/15/23, 18:22 EDT.  [facility] Hospitalist Team

## 2023-04-16 ENCOUNTER — READMISSION MANAGEMENT (OUTPATIENT)
Dept: CALL CENTER | Facility: HOSPITAL | Age: 63
End: 2023-04-16
Payer: OTHER GOVERNMENT

## 2023-04-16 VITALS
BODY MASS INDEX: 30.91 KG/M2 | HEIGHT: 68 IN | TEMPERATURE: 97.5 F | DIASTOLIC BLOOD PRESSURE: 89 MMHG | RESPIRATION RATE: 20 BRPM | OXYGEN SATURATION: 98 % | WEIGHT: 203.93 LBS | HEART RATE: 70 BPM | SYSTOLIC BLOOD PRESSURE: 151 MMHG

## 2023-04-16 LAB
ALBUMIN SERPL-MCNC: 3.9 G/DL (ref 3.5–5.2)
ALBUMIN/GLOB SERPL: 1.3 G/DL
ALP SERPL-CCNC: 102 U/L (ref 39–117)
ALT SERPL W P-5'-P-CCNC: 57 U/L (ref 1–41)
ANION GAP SERPL CALCULATED.3IONS-SCNC: 13 MMOL/L (ref 5–15)
AST SERPL-CCNC: 36 U/L (ref 1–40)
BASOPHILS # BLD AUTO: 0 10*3/MM3 (ref 0–0.2)
BASOPHILS NFR BLD AUTO: 0.5 % (ref 0–1.5)
BILIRUB SERPL-MCNC: 0.3 MG/DL (ref 0–1.2)
BUN SERPL-MCNC: 8 MG/DL (ref 8–23)
BUN/CREAT SERPL: 10 (ref 7–25)
CALCIUM SPEC-SCNC: 9.2 MG/DL (ref 8.6–10.5)
CHLORIDE SERPL-SCNC: 103 MMOL/L (ref 98–107)
CHOLEST SERPL-MCNC: 286 MG/DL (ref 0–200)
CO2 SERPL-SCNC: 24 MMOL/L (ref 22–29)
CREAT SERPL-MCNC: 0.8 MG/DL (ref 0.76–1.27)
DEPRECATED RDW RBC AUTO: 44.2 FL (ref 37–54)
EGFRCR SERPLBLD CKD-EPI 2021: 100.1 ML/MIN/1.73
EOSINOPHIL # BLD AUTO: 0.5 10*3/MM3 (ref 0–0.4)
EOSINOPHIL NFR BLD AUTO: 6.8 % (ref 0.3–6.2)
ERYTHROCYTE [DISTWIDTH] IN BLOOD BY AUTOMATED COUNT: 13.6 % (ref 12.3–15.4)
GLOBULIN UR ELPH-MCNC: 2.9 GM/DL
GLUCOSE SERPL-MCNC: 120 MG/DL (ref 65–99)
HCT VFR BLD AUTO: 42.7 % (ref 37.5–51)
HDLC SERPL-MCNC: 26 MG/DL (ref 40–60)
HGB BLD-MCNC: 14.2 G/DL (ref 13–17.7)
LDLC SERPL CALC-MCNC: 172 MG/DL (ref 0–100)
LDLC/HDLC SERPL: 6.6 {RATIO}
LYMPHOCYTES # BLD AUTO: 2.2 10*3/MM3 (ref 0.7–3.1)
LYMPHOCYTES NFR BLD AUTO: 28 % (ref 19.6–45.3)
MAGNESIUM SERPL-MCNC: 1.8 MG/DL (ref 1.6–2.4)
MCH RBC QN AUTO: 31 PG (ref 26.6–33)
MCHC RBC AUTO-ENTMCNC: 33.2 G/DL (ref 31.5–35.7)
MCV RBC AUTO: 93.4 FL (ref 79–97)
MONOCYTES # BLD AUTO: 0.7 10*3/MM3 (ref 0.1–0.9)
MONOCYTES NFR BLD AUTO: 8.9 % (ref 5–12)
NEUTROPHILS NFR BLD AUTO: 4.3 10*3/MM3 (ref 1.7–7)
NEUTROPHILS NFR BLD AUTO: 55.8 % (ref 42.7–76)
NRBC BLD AUTO-RTO: 0.1 /100 WBC (ref 0–0.2)
PHOSPHATE SERPL-MCNC: 3.1 MG/DL (ref 2.5–4.5)
PLATELET # BLD AUTO: 215 10*3/MM3 (ref 140–450)
PMV BLD AUTO: 9.3 FL (ref 6–12)
POTASSIUM SERPL-SCNC: 3.8 MMOL/L (ref 3.5–5.2)
PROT SERPL-MCNC: 6.8 G/DL (ref 6–8.5)
RBC # BLD AUTO: 4.57 10*6/MM3 (ref 4.14–5.8)
SODIUM SERPL-SCNC: 140 MMOL/L (ref 136–145)
TRIGL SERPL-MCNC: 442 MG/DL (ref 0–150)
VLDLC SERPL-MCNC: 88 MG/DL (ref 5–40)
WBC NRBC COR # BLD: 7.7 10*3/MM3 (ref 3.4–10.8)

## 2023-04-16 PROCEDURE — 93010 ELECTROCARDIOGRAM REPORT: CPT | Performed by: INTERNAL MEDICINE

## 2023-04-16 PROCEDURE — 94799 UNLISTED PULMONARY SVC/PX: CPT

## 2023-04-16 PROCEDURE — 85025 COMPLETE CBC W/AUTO DIFF WBC: CPT | Performed by: INTERNAL MEDICINE

## 2023-04-16 PROCEDURE — 94761 N-INVAS EAR/PLS OXIMETRY MLT: CPT

## 2023-04-16 PROCEDURE — 25010000002 MAGNESIUM SULFATE 2 GM/50ML SOLUTION: Performed by: NURSE PRACTITIONER

## 2023-04-16 PROCEDURE — 80053 COMPREHEN METABOLIC PANEL: CPT | Performed by: INTERNAL MEDICINE

## 2023-04-16 PROCEDURE — 83735 ASSAY OF MAGNESIUM: CPT | Performed by: INTERNAL MEDICINE

## 2023-04-16 PROCEDURE — 93005 ELECTROCARDIOGRAM TRACING: CPT | Performed by: INTERNAL MEDICINE

## 2023-04-16 PROCEDURE — 84100 ASSAY OF PHOSPHORUS: CPT | Performed by: INTERNAL MEDICINE

## 2023-04-16 PROCEDURE — 80061 LIPID PANEL: CPT | Performed by: INTERNAL MEDICINE

## 2023-04-16 PROCEDURE — 94664 DEMO&/EVAL PT USE INHALER: CPT

## 2023-04-16 RX ORDER — HYDRALAZINE HYDROCHLORIDE 25 MG/1
25 TABLET, FILM COATED ORAL 3 TIMES DAILY
Qty: 90 TABLET | Refills: 3 | Status: SHIPPED | OUTPATIENT
Start: 2023-04-16

## 2023-04-16 RX ORDER — AMLODIPINE BESYLATE 5 MG/1
10 TABLET ORAL DAILY
Qty: 30 TABLET | Refills: 3 | Status: SHIPPED | OUTPATIENT
Start: 2023-04-16

## 2023-04-16 RX ORDER — METOPROLOL SUCCINATE 25 MG/1
12.5 TABLET, EXTENDED RELEASE ORAL
Status: DISCONTINUED | OUTPATIENT
Start: 2023-04-16 | End: 2023-04-16 | Stop reason: HOSPADM

## 2023-04-16 RX ORDER — NICOTINE 21 MG/24HR
1 PATCH, TRANSDERMAL 24 HOURS TRANSDERMAL NIGHTLY
Qty: 1 EACH | Refills: 3 | Status: SHIPPED | OUTPATIENT
Start: 2023-04-16

## 2023-04-16 RX ORDER — MAGNESIUM SULFATE HEPTAHYDRATE 40 MG/ML
2 INJECTION, SOLUTION INTRAVENOUS ONCE
Status: COMPLETED | OUTPATIENT
Start: 2023-04-16 | End: 2023-04-16

## 2023-04-16 RX ORDER — ASPIRIN 81 MG/1
81 TABLET ORAL DAILY
Qty: 100 TABLET | Refills: 99 | Status: SHIPPED | OUTPATIENT
Start: 2023-04-17

## 2023-04-16 RX ORDER — LISINOPRIL 10 MG/1
10 TABLET ORAL 2 TIMES DAILY
Qty: 60 TABLET | Refills: 3 | Status: SHIPPED | OUTPATIENT
Start: 2023-04-16

## 2023-04-16 RX ORDER — METOPROLOL SUCCINATE 25 MG/1
12.5 TABLET, EXTENDED RELEASE ORAL
Qty: 15 TABLET | Refills: 3 | Status: SHIPPED | OUTPATIENT
Start: 2023-04-16

## 2023-04-16 RX ADMIN — LEVETIRACETAM 1500 MG: 500 TABLET, FILM COATED ORAL at 08:56

## 2023-04-16 RX ADMIN — HYDRALAZINE HYDROCHLORIDE 25 MG: 25 TABLET, FILM COATED ORAL at 05:14

## 2023-04-16 RX ADMIN — PANTOPRAZOLE SODIUM 40 MG: 40 TABLET, DELAYED RELEASE ORAL at 08:56

## 2023-04-16 RX ADMIN — ASPIRIN 81 MG: 81 TABLET, COATED ORAL at 08:56

## 2023-04-16 RX ADMIN — AMLODIPINE BESYLATE 10 MG: 5 TABLET ORAL at 08:56

## 2023-04-16 RX ADMIN — Medication 10 ML: at 09:00

## 2023-04-16 RX ADMIN — METOPROLOL SUCCINATE 12.5 MG: 25 TABLET, EXTENDED RELEASE ORAL at 12:35

## 2023-04-16 RX ADMIN — BUDESONIDE AND FORMOTEROL FUMARATE DIHYDRATE 2 PUFF: 160; 4.5 AEROSOL RESPIRATORY (INHALATION) at 06:57

## 2023-04-16 RX ADMIN — MAGNESIUM SULFATE HEPTAHYDRATE 2 G: 2 INJECTION, SOLUTION INTRAVENOUS at 09:00

## 2023-04-16 RX ADMIN — GABAPENTIN 1200 MG: 400 CAPSULE ORAL at 08:56

## 2023-04-16 RX ADMIN — Medication 10 ML: at 09:01

## 2023-04-16 RX ADMIN — LISINOPRIL 10 MG: 5 TABLET ORAL at 08:56

## 2023-04-16 NOTE — DISCHARGE SUMMARY
[facility] Medicine Services  Discharge Summary    Date of Service: 2023  Patient Name: Man Joseph  : 1960  MRN: [id number]    Date of Admission: 2023  Date of Discharge: 2023    Discharge Diagnosis: CAD status post PCI, COPD, hypertension, hyperlipidemia, GERD, CVA, seizure disorder, abdominal aortic aneurysm    Primary Care Physician: Provider, No Known      Presenting Problem:   Chest pain [R07.9]  Chest pain, unspecified type [R07.9]    Active and Resolved Hospital Problems:  Active Hospital Problems    Diagnosis POA   • **Chest pain, unspecified type [R07.9] Yes   • Chest pain [R07.9] Yes      Resolved Hospital Problems   No resolved problems to display.         Hospital Course     Hospital Course:  Man Joseph is a 62 y.o. male that presented to the emergency department with complaints of chest pain previously relieved with nitroglycerin, recurrent pain and  nitroglycerin prompted presentation to the ER.  Patient underwent cardiac cath and subsequent PCI to LAD, patient's medications have been titrated for blood pressure and heart rate, low-dose beta-blockade able to be initiated today.  Patient ambulating without difficulty, no further chest discomfort, right groin site without hematoma or drainage.  Patient is now deemed ready for discharge.  He is to follow-up as per appointments below and [facility].  Stressed the importance of compliance with Brilinta and aspirin and complete tobacco cessation.        DISCHARGE Follow Up Recommendations for labs and diagnostics: None      Reasons For Change In Medications and Indications for New Medications: Brilinta and aspirin antiplatelet therapy status post stent, metoprolol for cardioprotective, hydralazine for blood pressure management, NicoDerm patch for tobacco cessation      Day of Discharge     Vital Signs:  Temp:  [97.3 °F (36.3 °C)-98.3 °F (36.8 °C)] 97.3 °F (36.3 °C)  Heart Rate:  [48-67]  63  Resp:  [15-24] 24  BP: (100-172)/() 127/67    Physical Exam:  Physical Exam  Cardiovascular:      Rate and Rhythm: Normal rate and regular rhythm.      Pulses: Normal pulses.      Heart sounds: No murmur heard.  Pulmonary:      Effort: Pulmonary effort is normal.      Breath sounds: Normal breath sounds. No wheezing or rales.   Abdominal:      General: There is no distension.   Musculoskeletal:      Right lower leg: No edema.      Left lower leg: No edema.   Skin:     General: Skin is warm and dry.      Comments: Right groin without hematoma   Neurological:      Mental Status: He is alert and oriented to person, place, and time.   Psychiatric:         Mood and Affect: Mood normal.            Pertinent  and/or Most Recent Results     LAB RESULTS:      Lab 04/16/23  0502 04/15/23  0623 04/14/23  1656 04/14/23  1414 04/14/23  1145   WBC 7.70 8.80 6.30 9.90  --    HEMOGLOBIN 14.2 15.5 12.0* 15.5  --    HEMATOCRIT 42.7 46.5 36.1* 45.2  --    PLATELETS 215 244 172 191  --    NEUTROS ABS 4.30 4.40 3.00 5.20  --    LYMPHS ABS 2.20 3.00 2.10 3.00  --    MONOS ABS 0.70 0.70 0.60 0.70  --    EOS ABS 0.50* 0.50* 0.50* 0.80*  --    MCV 93.4 93.2 93.5 91.9  --    PROTIME  --   --   --   --  10.5         Lab 04/16/23  0502 04/15/23  0623 04/14/23  1757   SODIUM 140 140 141   POTASSIUM 3.8 4.2 4.4   CHLORIDE 103 103 102   CO2 24.0 25.0 29.0   ANION GAP 13.0 12.0 10.0   BUN 8 9 7*   CREATININE 0.80 0.81 0.82   EGFR 100.1 99.7 99.3   GLUCOSE 120* 139* 107*   CALCIUM 9.2 9.4 9.6   MAGNESIUM 1.8 1.7  --    PHOSPHORUS 3.1  --   --    HEMOGLOBIN A1C  --  7.10*  --    TSH  --  2.720  --          Lab 04/16/23  0502 04/15/23  0623 04/14/23  1757   TOTAL PROTEIN 6.8 7.4 7.5   ALBUMIN 3.9 4.2 4.2   GLOBULIN 2.9  --  3.3   ALT (SGPT) 57* 67* 70*   AST (SGOT) 36 41* 41*   BILIRUBIN 0.3 0.3 0.4   BILIRUBIN DIRECT  --  <0.2  --    ALK PHOS 102 113 108   LIPASE  --  49  --          Lab 04/14/23  2257 04/14/23  1757 04/14/23  0030  04/14/23  1145   PROBNP  --   --  36.2  --    HSTROP T 13 13  --   --    PROTIME  --   --   --  10.5   INR  --   --   --  1.02         Lab 04/16/23  0502 04/15/23  0623   CHOLESTEROL 286* 330*   LDL CHOL 172* 178*   HDL CHOL 26* 27*   TRIGLYCERIDES 442* 603*             Brief Urine Lab Results     None        Microbiology Results (last 10 days)     ** No results found for the last 240 hours. **          XR Chest 1 View    Result Date: 4/14/2023  Impression: Impression: No acute cardiopulmonary findings. Electronically Signed: Noble Little  4/14/2023 3:27 PM EDT  Workstation ID: [id number]    CT Angiogram Chest Pulmonary Embolism    Result Date: 4/15/2023  Impression: 1. No evidence of pulmonary embolism. 2. No evidence of thoracic aortic aneurysm or dissection. 3. No evidence of pneumonia, pleural or pericardial effusions. 4. Subtle nodular contour to liver is suggestive of cirrhosis. 5. Mild emphysema. Electronically signed by:  Abiel Rutledge D.O.  4/14/2023 11:04 PM Mountain Time              Results for orders placed during the hospital encounter of 04/27/22    Adult Transthoracic Echo Complete W/ Cont if Necessary Per Protocol    Interpretation Summary  · Estimated left ventricular EF = 60% Left ventricular systolic function is normal.    Indication  Shortness of breath    Technically satisfactory study.  Mitral valve is structurally normal.  Tricuspid valve is structurally normal.  Aortic valve is thickened with adequate opening motion.  Pulmonic valve could not be well visualized.  No evidence for mitral tricuspid or aortic regurgitation is seen by Doppler study.  Left atrium is normal in size.  Right atrium is normal in size.  Left ventricle is normal in size and contractility with ejection fraction of 60%.  Right ventricle is normal in size.  Atrial septum is intact.  Aorta is normal.  No pericardial effusion or intracardiac thrombus is seen.    Impression  Structurally and functionally normal cardiac valves  except thickened aortic valve with adequate opening motion..  Left ventricular size and contractility is normal with ejection fraction of 60%.      Labs Pending at Discharge:      Procedures Performed  Procedure(s):  Left Heart Cath possible PCI, atherectomy, hemodynamic support  Functional Flow [place]  Percutaneous Coronary Intervention  Stent DULCE MARIA coronary         Consults:   Consults     Date and Time Order Name Status Description    4/15/2023 12:58 PM Inpatient Hospitalist Consult Completed     4/14/2023  8:26 PM Inpatient Cardiology Consult Completed             Discharge Details        Discharge Medications      New Medications      Instructions Start Date   aspirin 81 MG EC tablet   81 mg, Oral, Daily   Start Date: April 17, 2023     hydrALAZINE 25 MG tablet  Commonly known as: APRESOLINE   25 mg, Oral, 3 Times Daily      metoprolol succinate XL 25 MG 24 hr tablet  Commonly known as: TOPROL-XL   12.5 mg, Oral, Every 24 Hours Scheduled      nicotine 21 MG/24HR patch  Commonly known as: NICODERM CQ   1 patch, Transdermal, Nightly      ticagrelor 90 MG tablet tablet  Commonly known as: BRILINTA   90 mg, Oral, Every 12 Hours Scheduled         Changes to Medications      Instructions Start Date   amLODIPine 5 MG tablet  Commonly known as: NORVASC  What changed: how much to take   10 mg, Oral, Daily      lisinopril 10 MG tablet  Commonly known as: PRINIVIL,ZESTRIL  What changed:   medication strength  how to take this   10 mg, Oral, 2 Times Daily         Continue These Medications      Instructions Start Date   ALBUTEROL IN   90 mcg, Inhalation, As Needed      atorvastatin 80 MG tablet  Commonly known as: LIPITOR   80 mg, Oral, Daily      budesonide-formoterol 160-4.5 MCG/ACT inhaler  Commonly known as: SYMBICORT   2 puffs, Inhalation, 2 Times Daily - RT      Cholecalciferol 10 MCG (400 UNIT) capsule   Oral, HOLDING FOR OR --12/7/21      cyclobenzaprine 10 MG tablet  Commonly known as: FLEXERIL   10 mg, Oral, 2  Times Daily PRN      fish oil 1000 MG capsule capsule   1,000 mg, Oral, 3 Times Daily With Meals      gabapentin 400 MG capsule  Commonly known as: NEURONTIN   1,200 mg, Oral, 3 Times Daily      latanoprost 0.005 % ophthalmic solution  Commonly known as: XALATAN   1 drop, Ophthalmic      levETIRAcetam 750 MG tablet  Commonly known as: KEPPRA   1,500 mg, Oral, 2 Times Daily      pantoprazole 40 MG EC tablet  Commonly known as: PROTONIX   40 mg, Oral      tiotropium 18 MCG per inhalation capsule  Commonly known as: SPIRIVA   1 capsule, Inhalation, Daily         Stop These Medications    meloxicam 15 MG tablet  Commonly known as: MOBIC            Allergies   Allergen Reactions   • Penicillins Rash         Discharge Disposition: Stable  Home or Self Care    Diet:  Hospital:  Diet Order   Procedures   • Diet: Cardiac Diets; Healthy Heart (2-3 Na+); Texture: Regular Texture (IDDSI 7); Fluid Consistency: Thin (IDDSI 0)         Discharge Activity:   Activity Instructions     Activity as Tolerated              CODE STATUS:  Code Status and Medical Interventions:   Ordered at: 04/15/23 1242     Level Of Support Discussed With:    Patient     Code Status (Patient has no pulse and is not breathing):    CPR (Attempt to Resuscitate)     Medical Interventions (Patient has pulse or is breathing):    Full Support     Release to patient:    Routine Release         No future appointments.    Additional Instructions for the Follow-ups that You Need to Schedule     Discharge Follow-up with PCP   As directed       Currently Documented PCP:    Pranav, No Known    PCP Phone Number:    [phone]     Follow Up Details: 1-2 weeks         Discharge Follow-up with Specified Provider: Dr. Vasquez, cardiology, call for appointment; 2 Weeks   As directed      To: Dr. Vasquez, cardiology, call for appointment    Follow Up: 2 Weeks               Time spent on Discharge including face to face service: Less than 30 minutes    Patient was examined  using appropriate personal protective equipment    Signature: Electronically signed by RADHA Fenton, 04/16/23, 10:39 EDT.  [facility] Hospitalist Team

## 2023-04-16 NOTE — PLAN OF CARE
Problem: Adult Inpatient Plan of Care  Goal: Absence of Hospital-Acquired Illness or Injury  Intervention: Identify and Manage Fall Risk  Recent Flowsheet Documentation  Taken 4/16/2023 0400 by Amos Trimble RN  Safety Promotion/Fall Prevention: safety round/check completed  Taken 4/16/2023 0000 by Amos Trimble RN  Safety Promotion/Fall Prevention: safety round/check completed  Taken 4/15/2023 2000 by Amos Trimble RN  Safety Promotion/Fall Prevention: safety round/check completed     Problem: Adult Inpatient Plan of Care  Goal: Absence of Hospital-Acquired Illness or Injury  Intervention: Prevent Skin Injury  Recent Flowsheet Documentation  Taken 4/16/2023 0400 by Amos Trimble RN  Body Position: position changed independently  Taken 4/16/2023 0000 by Amos Trimble RN  Body Position: position changed independently  Taken 4/15/2023 2000 by Amos Trimble RN  Body Position: position changed independently  Skin Protection: adhesive use limited   Goal Outcome Evaluation:

## 2023-04-16 NOTE — PROGRESS NOTES
Cardiology [place]        LOS:  LOS: 1 day   Patient Name: Man Joseph  Age/Sex: 62 y.o. male  : 1960  MRN: [id number]    Day of Service: 23   Length of Stay: 1  Encounter Provider: RADHA Burch  Place of Service: [facility] CARDIOLOGY  Patient Care Team:  Provider, No Known as PCP - General    Subjective:     Chief Complaint: f/u CAD, chest pain    Subjective: patient has no acute complaints. He is s/p intervention yesterday. Doing well, on ASA  Brilinta. No CP    Current Medications:   Scheduled Meds:amLODIPine, 10 mg, Oral, Daily  aspirin, 81 mg, Oral, Daily  atorvastatin, 80 mg, Oral, Nightly  budesonide-formoterol, 2 puff, Inhalation, BID - RT  gabapentin, 1,200 mg, Oral, TID  hydrALAZINE, 25 mg, Oral, Q8H  latanoprost, 1 drop, Both Eyes, Nightly  levETIRAcetam, 1,500 mg, Oral, BID  lisinopril, 10 mg, Oral, Q12H  metoprolol succinate XL, 12.5 mg, Oral, Q24H  nicotine, 1 patch, Transdermal, Nightly  pantoprazole, 40 mg, Oral, Daily  senna-docusate sodium, 2 tablet, Oral, BID  sodium chloride, 10 mL, Intravenous, Q12H  sodium chloride, 10 mL, Intravenous, Q12H  ticagrelor, 90 mg, Oral, Q12H      Continuous Infusions:     Allergies:  Allergies   Allergen Reactions   • Penicillins Rash       Review of Systems   Constitutional: Negative for chills, diaphoresis and malaise/fatigue.   Cardiovascular: Negative for chest pain, dyspnea on exertion, irregular heartbeat, leg swelling, near-syncope, orthopnea, palpitations, paroxysmal nocturnal dyspnea and syncope.   Respiratory: Negative for cough, shortness of breath, sleep disturbances due to breathing and sputum production.    Gastrointestinal: Negative for change in bowel habit.   Genitourinary: Negative for urgency.   Neurological: Negative for dizziness and headaches.   Psychiatric/Behavioral: Negative for altered mental status.         Objective:     Temp:  [97.3 °F (36.3 °C)-97.6 °F (36.4 °C)] 97.5 °F (36.4  °C)  Heart Rate:  [50-70] 70  Resp:  [15-24] 20  BP: (100-172)/() 151/89     Intake/Output Summary (Last 24 hours) at 4/16/2023 1424  Last data filed at 4/16/2023 0945  Gross per 24 hour   Intake 480 ml   Output --   Net 480 ml     Body mass index is 31.01 kg/m².      04/14/23  2017 04/15/23  1248 04/16/23  0514   Weight: 94.1 kg (207 lb 7.3 oz) 94.6 kg (208 lb 8.9 oz) 92.5 kg (203 lb 14.8 oz)         General Appearance:    Alert, cooperative, in no acute distress                                Head: Atraumatic, normocephalic, PERRLA               Neck:   supple, trachea midline, no thyromegaly, no carotid bruit, no JVD   Lungs:     Clear to auscultation, respirations regular, even and               unlabored    Heart:    Regular rhythm and normal rate, normal S1 and S2   Abdomen:     Normal bowel sounds, no masses, no organomegaly, soft  nontender, nondistended, no guarding, no rebound  tenderness   Extremities:   Moves all extremities well, no edema, no cyanosis, no  Redness stable groin   Pulses:   Pulses palpable and equal bilaterally   Skin:   No bleeding, bruising or rash   Neurologic:   Awake, alert, oriented x3         Lab Review:   Results from last 7 days   Lab Units 04/16/23  0502 04/15/23  0623   SODIUM mmol/L 140 140   POTASSIUM mmol/L 3.8 4.2   CHLORIDE mmol/L 103 103   CO2 mmol/L 24.0 25.0   BUN mg/dL 8 9   CREATININE mg/dL 0.80 0.81   GLUCOSE mg/dL 120* 139*   CALCIUM mg/dL 9.2 9.4   AST (SGOT) U/L 36 41*   ALT (SGPT) U/L 57* 67*     Results from last 7 days   Lab Units 04/14/23  2253 04/14/23  1757   HSTROP T ng/L 13 13     Results from last 7 days   Lab Units 04/16/23  0502 04/15/23  0623   WBC 10*3/mm3 7.70 8.80   HEMOGLOBIN g/dL 14.2 15.5   HEMATOCRIT % 42.7 46.5   PLATELETS 10*3/mm3 215 244     Results from last 7 days   Lab Units 04/14/23  1145   INR  1.02     Results from last 7 days   Lab Units 04/16/23  0502 04/15/23  0623   MAGNESIUM mg/dL 1.8 1.7     Results from last 7 days   Lab  Units 04/16/23  0502 04/15/23  0623   CHOLESTEROL mg/dL 286* 330*   TRIGLYCERIDES mg/dL 442* 603*   HDL CHOL mg/dL 26* 27*     Results from last 7 days   Lab Units 04/14/23  1656   PROBNP pg/mL 36.2     Results from last 7 days   Lab Units 04/15/23  0623   TSH uIU/mL 2.720       Recent Radiology:  Imaging Results (Most Recent)     Procedure Component Value Units Date/Time    CT Angiogram Chest Pulmonary Embolism [[id number]] Collected: 04/14/23 2302     Updated: 04/15/23 0105    Narrative:      Exam: CT Angiography of the Chest.    Date: 4/15/2023.     Comparison: None    History: Elevated d-dimer with chest pain.    Technique: CT examination of the chest was performed following the intravenous administration of 100 mL of Isovue-370. Sagittal, coronal and 3-D reformatted images were provided. CT dose lowering techniques were used, to include: automated exposure   control, adjustment for patient size, and/or use of iterative reconstruction.    FINDINGS:    Mediastinum and Cora: There is no axillary, mediastinal or hilar lymphadenopathy.    Pleural and Pericardial spaces: There are no pleural or pericardial effusions.    Upper Abdomen: Subtle nodular contour to the liver suspicious for cirrhosis. There are multiple calcified granulomas within the spleen. The visualized upper abdomen otherwise appears unremarkable.    Cardiovascular: The thoracic aorta is normal in size without evidence of aneurysm or dissection.    Pulmonary Artery: There are no filling defects in the pulmonary arteries.    Lung Parenchyma and Airways: There is mild upper lobe predominant centrilobular and paraseptal emphysema. The lungs otherwise appear clear.    Bones: No fracture or aggressive osseous lesion.      Impression:        1. No evidence of pulmonary embolism.  2. No evidence of thoracic aortic aneurysm or dissection.  3. No evidence of pneumonia, pleural or pericardial effusions.  4. Subtle nodular contour to liver is suggestive of  cirrhosis.  5. Mild emphysema.      Electronically signed by:  Abiel Rutledge D.O.    4/14/2023 11:04 PM Mountain Time    XR Chest 1 View [[id number]] Collected: 04/14/23 1526     Updated: 04/14/23 1529    Narrative:      XR CHEST 1 VW    Date of Exam: 4/14/2023 3:21 PM EDT    Indication: CHF/COPD Protocol  CHF/COPD Protocol.    Comparison: 4/27/2022    Findings:  Cervical fixation hardware is noted.    Probable calcified granulomatous changes in the left lung base. No focal pulmonary consolidations are evident. Evaluation of the bases somewhat limited due to overlying soft tissue. No pneumothorax is seen. No pleural fluid is evident. Heart size and   mediastinal contour appear within normal limits. Pulmonary vascularity appears unchanged.      Impression:      Impression:  No acute cardiopulmonary findings.    Electronically Signed: Noble Little    4/14/2023 3:27 PM EDT    Workstation ID: [id number]          ECHOCARDIOGRAM:    Results for orders placed during the hospital encounter of 04/27/22    Adult Transthoracic Echo Complete W/ Cont if Necessary Per Protocol    Interpretation Summary  · Estimated left ventricular EF = 60% Left ventricular systolic function is normal.    Indication  Shortness of breath    Technically satisfactory study.  Mitral valve is structurally normal.  Tricuspid valve is structurally normal.  Aortic valve is thickened with adequate opening motion.  Pulmonic valve could not be well visualized.  No evidence for mitral tricuspid or aortic regurgitation is seen by Doppler study.  Left atrium is normal in size.  Right atrium is normal in size.  Left ventricle is normal in size and contractility with ejection fraction of 60%.  Right ventricle is normal in size.  Atrial septum is intact.  Aorta is normal.  No pericardial effusion or intracardiac thrombus is seen.    Impression  Structurally and functionally normal cardiac valves except thickened aortic valve with adequate opening motion..  Left  ventricular size and contractility is normal with ejection fraction of 60%.        I reviewed the patient's new clinical results.    EKG:      Assessment:       Chest pain, unspecified type    Chest pain    1. Chest Pain concerning for angina / CAD  - s/p LHC - s/p PCI to LAD  -  ASA / Brilinta     2. CAD s/p RCA stenting in 2020 per prior documentation,     3. AAA     4. COPD     5. HTN     6.  HLD     7. GERD     8. CVA     9. seizure disorder       Plan:   Continue DAPT for 1 year  Discussed medication compliance   Continue statin/ low dose beta blocker, HR stable  Patient has known aortic aneurysm under surveillance by vascular surgery and thoracic surgery    [facility]  Okay to discharge today        Jasmyne Billy, APRN  04/16/23  14:24 EDT

## 2023-04-17 LAB — QT INTERVAL: 396 MS

## 2023-04-17 NOTE — OUTREACH NOTE
Prep Survey    Flowsheet Row Responses   [facility] patient discharged from? [facility]   Is LACE score < 7 ? No   Eligibility Readm Mgmt   Discharge diagnosis CAD status post PCI,   Does the patient have one of the following disease processes/diagnoses(primary or secondary)? Other   Does the patient have Home health ordered? No   Is there a DME ordered? No   Prep survey completed? Yes          Dina HARTMAN - Registered Nurse

## 2023-04-19 ENCOUNTER — READMISSION MANAGEMENT (OUTPATIENT)
Dept: CALL CENTER | Facility: HOSPITAL | Age: 63
End: 2023-04-19
Payer: OTHER GOVERNMENT

## 2023-04-19 LAB
QT INTERVAL: 422 MS
QT INTERVAL: 432 MS
QT INTERVAL: 438 MS

## 2023-04-19 NOTE — OUTREACH NOTE
Medical Week 1 Survey    Flowsheet Row Responses   [facility] facility patient discharged from? [facility]   Does the patient have one of the following disease processes/diagnoses(primary or secondary)? Other   Week 1 attempt successful? No   Unsuccessful attempts Attempt 1          MADISON REECE - Registered Nurse

## 2023-04-24 ENCOUNTER — READMISSION MANAGEMENT (OUTPATIENT)
Dept: CALL CENTER | Facility: HOSPITAL | Age: 63
End: 2023-04-24
Payer: OTHER GOVERNMENT

## 2023-04-24 NOTE — OUTREACH NOTE
Medical Week 1 Survey    Flowsheet Row Responses   [facility] facility patient discharged from? [facility]   Does the patient have one of the following disease processes/diagnoses(primary or secondary)? Other   Week 1 attempt successful? No   Unsuccessful attempts Attempt 2          Chuyita HARTMAN - Licensed Nurse

## 2023-05-02 ENCOUNTER — OFFICE VISIT (OUTPATIENT)
Dept: CARDIOLOGY | Facility: CLINIC | Age: 63
End: 2023-05-02
Payer: OTHER GOVERNMENT

## 2023-05-02 VITALS
HEART RATE: 68 BPM | SYSTOLIC BLOOD PRESSURE: 114 MMHG | HEIGHT: 68 IN | WEIGHT: 212 LBS | OXYGEN SATURATION: 94 % | DIASTOLIC BLOOD PRESSURE: 75 MMHG | BODY MASS INDEX: 32.13 KG/M2

## 2023-05-02 DIAGNOSIS — E78.2 MIXED HYPERLIPIDEMIA: Chronic | ICD-10-CM

## 2023-05-02 DIAGNOSIS — I25.10 CORONARY ARTERY DISEASE INVOLVING NATIVE CORONARY ARTERY OF NATIVE HEART WITHOUT ANGINA PECTORIS: Primary | ICD-10-CM

## 2023-05-02 DIAGNOSIS — I10 ESSENTIAL HYPERTENSION: Chronic | ICD-10-CM

## 2023-05-02 RX ORDER — FLUTICASONE PROPIONATE AND SALMETEROL 250; 50 UG/1; UG/1
1 POWDER RESPIRATORY (INHALATION) 2 TIMES DAILY
COMMUNITY

## 2023-05-02 RX ORDER — NITROGLYCERIN 0.4 MG/1
0.4 TABLET SUBLINGUAL
COMMUNITY

## 2023-05-02 RX ORDER — METFORMIN HYDROCHLORIDE EXTENDED-RELEASE TABLETS 500 MG/1
500 TABLET, FILM COATED, EXTENDED RELEASE ORAL
COMMUNITY

## 2023-05-02 NOTE — PROGRESS NOTES
Cardiology Office Follow Up Visit      Primary Care Provider:  Provider, No Known    Reason for f/u:     Coronary artery disease  Status post PCI  Hypertension  Dyslipidemia      Subjective     CC:    Denies worsening chest pain    History of Present Illness       Man Joseph is a 62 y.o. male.  Patient is a pleasant 62-year-old male who is known to have    Coronary artery disease.  He had previous PCI done through the [facility]    He had PCI to the RCA done in 2020.  He is known to have abdominal aortic aneurysm and is followed by the aneurysm clinic, COPD, hypertension, dyslipidemia, GERD, CVA, seizure disorder.    In April 2022 the patient had a echocardiogram which was unremarkable and had a stress Myoview which showed no reversible ischemia.    In mid April the patient presented to [facility] with complaints of chest pain.  He was ruled out for an MI.  He went on to have cardiac catheterization performed by Dr. Vasquez.    Cardiac catheterization revealed significant stenosis in the LAD and he underwent successful PCI with Xience drug-eluting stent.  Patient had Left Circumflex stenosis of 60 to 70% IFR was 0.95 and not thought to be hemodynamically significant.  Widely patent stent in the RCA.    Since his discharge the patient has been compliant with medical therapy.  Prior to his hospitalization he had stopped taking his statin but he has been back on it now.    He has not had any recurrent exertional chest pain that severe.  Breathing has been stable.      ASSESSMENT/PLAN:      Diagnoses and all orders for this visit:    1. Coronary artery disease involving native coronary artery of native heart without angina pectoris (Primary)    2. Essential hypertension    3. Mixed hyperlipidemia            MEDICAL DECISION MAKING:    Patient is not having any signs or symptoms to suggest unstable angina.  EKG today shows sinus rhythm with no acute ST or T wave segment abnormalities.    The patient will  "continue guideline directed medical therapy including dual antiplatelet therapy with aspirin and Brilinta, statin, beta-blocker, hydralazine.  Patient's amlodipine was recently discontinued due to low blood pressure.    We have discussed cardiac rehab but he is not interested in pursuing that at this time.  In addition we have discussed his risk for sleep apnea he said he could never wear a mask for treatment.  In addition we discussed repeat checking a lipid panel in about 6 weeks.  If his LDL cholesterol remains above 70 would recommend considering addition of Repatha or Praluent.    He will have follow-up with Dr. Vasquez in 8 weeks if he has any new or worsening problems of asked him to return sooner      Past Medical History:   Diagnosis Date   • Abdominal aortic aneurysm (AAA) without rupture 04/27/2022   • Abnormal ECG 4/16/23   • GONZALO (acute kidney injury) 04/27/2022   • Aneurysm Years ago    3 caths & mid aortic   • Aortic aneurysm     midline    • Basal cell carcinoma     NOSE/RT EYE-DUCT   • Basal cell carcinoma (BCC) of skin of nose 04/27/2022    Nose and eyelid    • Cancer     RT KIDNEY   • Cardiac arrest     15YO    • Chest pain due to myocardial ischemia 04/27/2022   • COPD (chronic obstructive pulmonary disease)    • COPD (chronic obstructive pulmonary disease) 04/27/2022   • Coronary artery disease Years ago   • Essential hypertension 04/27/2022   • GERD (gastroesophageal reflux disease)    • GERD without esophagitis 04/27/2022   • History of chest pain    • Hyperkalemia 04/27/2022   • Hyperlipidemia    • Hypertension    • MI (myocardial infarction)    • Mitral valve prolapse Decades ago    Not sure…conflicting opinions   • Mixed hyperlipidemia 04/27/2022   • Mixed simple and mucopurulent chronic bronchitis 04/27/2022   • PTSD (post-traumatic stress disorder)    • Seizure    • Sleep apnea Not sure   • Stroke     TIA-\"MINOR\"   • TBI (traumatic brain injury)     AGE 24   • Tinnitus        Past Surgical " History:   Procedure Laterality Date   • APPENDECTOMY      15YO   • CARDIAC CATHETERIZATION N/A 04/15/2023    Procedure: Left Heart Cath possible PCI, atherectomy, hemodynamic support;  Surgeon: Augustin Vasquez MD;  Location: [facility] CATH INVASIVE LOCATION;  Service: Cardiology;  Laterality: N/A;   • CARDIAC CATHETERIZATION  04/15/2023    Procedure: Functional Flow [place];  Surgeon: Augustin Vasquez MD;  Location: [facility] CATH INVASIVE LOCATION;  Service: Cardiology;;  CX/LAD   • CARDIAC CATHETERIZATION N/A 04/15/2023    Procedure: Percutaneous Coronary Intervention;  Surgeon: Augustin Vasquez MD;  Location: [facility] CATH INVASIVE LOCATION;  Service: Cardiology;  Laterality: N/A;  LAD   • CARDIAC CATHETERIZATION N/A 04/15/2023    Procedure: Stent DULCE MARIA coronary;  Surgeon: Augustin Vasquez MD;  Location: [facility] CATH INVASIVE LOCATION;  Service: Cardiology;  Laterality: N/A;  LAD   • CARPAL TUNNEL RELEASE Bilateral    • CERVICAL FUSION      C4-C5-C6   • CHALAZION EXCISION Right 12/21/2021    Procedure: RIGHT EXCISION OF BASAL CELL CARCINOMA WITH FROZEN SECTION;  Surgeon: Joshua Clemens MD;  [facility];  Service: Ophthalmology;  Laterality: Right;   • CORONARY ANGIOPLASTY WITH STENT PLACEMENT     • HEAD/NECK LESION/CYST EXCISION Right 12/21/2021    Procedure: RIGHT MEDIAL CANTHUS REPAIR WITH ROTATIONAL FLAP PROBING OF RIGHT TEAR DUCT;  Surgeon: Joshua Clemens MD;  Location: [facility];  Service: Ophthalmology;  Laterality: Right;   • HERNIA REPAIR Left     x2   • KIDNEY SURGERY Right     PARTIAL NEPHRECTOMY   • SHOULDER ARTHROSCOPY Right    • SINUS SURGERY     • SKIN CANCER EXCISION  2019    BRIDGE OF NOSE    • TONSILLECTOMY      ADNOIDS         Current Outpatient Medications:   •  ALBUTEROL IN, Inhale 90 mcg As Needed., Disp: , Rfl:   •  amLODIPine (NORVASC) 5 MG tablet, Take 2 tablets by mouth Daily., Disp: 30 tablet, Rfl: 3  •  aspirin 81 MG EC tablet,  Take 1 tablet by mouth Daily., Disp: 100 tablet, Rfl: 99  •  atorvastatin (LIPITOR) 80 MG tablet, Take 1 tablet by mouth Daily., Disp: , Rfl:   •  budesonide-formoterol (SYMBICORT) 160-4.5 MCG/ACT inhaler, Inhale 2 puffs 2 (Two) Times a Day., Disp: , Rfl:   •  Cholecalciferol 10 MCG (400 UNIT) capsule, Take  by mouth. HOLDING FOR OR --12/7/21, Disp: , Rfl:   •  cyclobenzaprine (FLEXERIL) 10 MG tablet, Take 1 tablet by mouth 2 (Two) Times a Day As Needed., Disp: , Rfl:   •  Fluticasone-Salmeterol (ADVAIR/WIXELA) 250-50 MCG/ACT DISKUS, Inhale 1 puff 2 (Two) Times a Day., Disp: , Rfl:   •  gabapentin (NEURONTIN) 400 MG capsule, Take 3 capsules by mouth 3 (Three) Times a Day., Disp: , Rfl:   •  hydrALAZINE (APRESOLINE) 25 MG tablet, Take 1 tablet by mouth 3 (Three) Times a Day., Disp: 90 tablet, Rfl: 3  •  latanoprost (XALATAN) 0.005 % ophthalmic solution, Apply 1 drop to eye(s) as directed by provider., Disp: , Rfl:   •  levETIRAcetam (KEPPRA) 750 MG tablet, Take 2 tablets by mouth 2 (Two) Times a Day., Disp: , Rfl:   •  lisinopril (PRINIVIL,ZESTRIL) 10 MG tablet, Take 1 tablet by mouth 2 (Two) Times a Day., Disp: 60 tablet, Rfl: 3  •  metFORMIN (FORTAMET) 500 MG (OSM) 24 hr tablet, Take 1 tablet by mouth Daily Before Supper., Disp: , Rfl:   •  metoprolol succinate XL (TOPROL-XL) 25 MG 24 hr tablet, Take 0.5 tablets by mouth Daily., Disp: 15 tablet, Rfl: 3  •  nicotine (NICODERM CQ) 21 MG/24HR patch, Place 1 patch on the skin as directed by provider Every Night., Disp: 1 each, Rfl: 3  •  nitroglycerin (NITROSTAT) 0.4 MG SL tablet, Place 1 tablet under the tongue Every 5 (Five) Minutes As Needed for Chest Pain. Take no more than 3 doses in 15 minutes., Disp: , Rfl:   •  Omega-3 Fatty Acids (fish oil) 1000 MG capsule capsule, Take 1 capsule by mouth 3 (Three) Times a Day With Meals., Disp: , Rfl:   •  pantoprazole (PROTONIX) 40 MG EC tablet, Take 1 tablet by mouth., Disp: , Rfl:   •  ticagrelor (BRILINTA) 90 MG tablet  "tablet, Take 1 tablet by mouth Every 12 (Twelve) Hours., Disp: 60 tablet, Rfl: 3  •  tiotropium (SPIRIVA) 18 MCG per inhalation capsule, Place 1 capsule into inhaler and inhale Daily., Disp: , Rfl:     Social History     Socioeconomic History   • Marital status:    Tobacco Use   • Smoking status: Every Day     Packs/day: 1.00     Years: 50.00     Pack years: 50.00     Types: Cigarettes   • Smokeless tobacco: Never   • Tobacco comments:     SINCE 12 YO-advised and is on patches.   Vaping Use   • Vaping Use: Never used   Substance and Sexual Activity   • Alcohol use: Yes     Comment: Rare   • Drug use: Never   • Sexual activity: Not Currently       Family History   Problem Relation Age of Onset   • Malig Hyperthermia Neg Hx        The following portions of the patient's history were reviewed and updated as appropriate: allergies, current medications, past family history, past medical history, past social history, past surgical history and problem list.    Review of Systems   Constitutional: Positive for malaise/fatigue.   Cardiovascular: Positive for dyspnea on exertion.   All other systems reviewed and are negative.      Pertinent items are noted in HPI, all other systems reviewed and negative    /75 (BP Location: Right arm, Patient Position: Sitting, Cuff Size: Large Adult)   Pulse 68   Ht 172.7 cm (68\")   Wt 96.2 kg (212 lb)   SpO2 94%   BMI 32.23 kg/m² .  Objective     Vitals reviewed.   Constitutional:       General: Not in acute distress.     Appearance: Normal appearance. Well-developed.   Eyes:      Pupils: Pupils are equal, round, and reactive to light.   HENT:      Head: Normocephalic and atraumatic.   Neck:      Vascular: No JVD.   Pulmonary:      Effort: Pulmonary effort is normal.      Breath sounds: Normal breath sounds.   Cardiovascular:      Normal rate. Regular rhythm.   Pulses:     Intact distal pulses.   Edema:     Peripheral edema absent.   Abdominal:      General: There is no " distension.      Palpations: Abdomen is soft.      Tenderness: There is no abdominal tenderness.   Musculoskeletal: Normal range of motion.      Cervical back: Normal range of motion and neck supple. Skin:     General: Skin is warm and dry.   Neurological:      Mental Status: Alert and oriented to person, place, and time.             ECG 12 Lead    Date/Time: 5/2/2023 1:32 PM  Performed by: Annamarie Hayes APRN  Authorized by: Annamarie Hayes APRN   Comparison: compared with previous ECG   Similar to previous ECG  Rhythm: sinus rhythm  BPM: 68  Conduction: conduction normal  ST Segments: ST segments normal  T Waves: T waves normal  Other findings: non-specific ST-T wave changes    Clinical impression: non-specific ECG            EKG ordered by and reviewed by me in office

## (undated) DEVICE — NDL HYPO PRECISIONGLIDE REG 25G 1 1/2

## (undated) DEVICE — CODMAN® SURGICAL PATTIES 1/2" X 3" (1.27CM X 7.62CM): Brand: CODMAN®

## (undated) DEVICE — SUT VIC 4/0 P3 18IN J494G

## (undated) DEVICE — PK ENT 40

## (undated) DEVICE — SUT GUT PLN FAST ABS 5/0 PC1 18IN 1915G

## (undated) DEVICE — ANGIO-SEAL VIP VASCULAR CLOSURE DEVICE: Brand: ANGIO-SEAL

## (undated) DEVICE — GW RUNTHROUGH NS HYPERCOAT .014 3X180CM

## (undated) DEVICE — SUT VIC 5/0 P3 18IN J493G

## (undated) DEVICE — GAUZE,SPONGE,4"X4",16PLY,XRAY,STRL,LF: Brand: MEDLINE

## (undated) DEVICE — TRAP FLD MINIVAC MEGADYNE 100ML

## (undated) DEVICE — SUT GUT PLN 5/0 P3 18IN 686G

## (undated) DEVICE — SUT SILK 4/0 TIES 18IN A183H

## (undated) DEVICE — GLV SURG BIOGEL SENSR LTX PF SZ7.5

## (undated) DEVICE — CATH GUIDE LAUNCHER EBU4.0 6F 100CM

## (undated) DEVICE — CATH MPAC STP 6F: Brand: SUPER TORQUE

## (undated) DEVICE — PK TRY HEART CATH 50

## (undated) DEVICE — ELECTRD NDL EZ CLN MOD 2.75IN

## (undated) DEVICE — CONTAINER,SPECIMEN,OR STERILE,4OZ: Brand: MEDLINE

## (undated) DEVICE — HDRST POSITIONING FM RND 2X9IN

## (undated) DEVICE — SPNG GZ WOVN 4X4IN 12PLY 10/BX STRL

## (undated) DEVICE — STERILE COTTON TIP 6IN 10PK: Brand: MEDLINE

## (undated) DEVICE — 3M™ STERI-STRIP™ REINFORCED ADHESIVE SKIN CLOSURES, R1547, 1/2 IN X 4 IN (12 MM X 100 MM), 6 STRIPS/ENVELOPE: Brand: 3M™ STERI-STRIP™

## (undated) DEVICE — GLV SURG BIOGEL LTX PF 7 1/2

## (undated) DEVICE — GW EMR FIX EXCHG J STD .035 3MM 260CM

## (undated) DEVICE — ST ACC MICROPUNCTURE STFF/CANN PLAT/TP 4F 21G 40CM

## (undated) DEVICE — PATIENT RETURN ELECTRODE, SINGLE-USE, CONTACT QUALITY MONITORING, ADULT, WITH 9FT CORD, FOR PATIENTS WEIGING OVER 33LBS. (15KG): Brand: MEGADYNE

## (undated) DEVICE — GLV SURG BIOGEL LTX PF 6 1/2

## (undated) DEVICE — NC TREK NEO™ CORONARY DILATATION CATHETER 2.50 X 20 MM / RAPID-EXCHANGE: Brand: NC TREK NEO™

## (undated) DEVICE — GW PTFE EMERALD HEPCOAT FC J TIP STD .035 3MM 150CM

## (undated) DEVICE — PENCL E/S ULTRAVAC TELESCP NOSE HOLSTR 10FT

## (undated) DEVICE — SUT SILK B OPTH G1 6/0 18IN 780G BX/12

## (undated) DEVICE — ELECTRD DEFIB M/FUNC PROPADZ RADIOL 2PK

## (undated) DEVICE — SUT PROLN 5/0 P3 18IN 8698G

## (undated) DEVICE — DRSNG TELFA PAD NONADH STR 1S 3X4IN

## (undated) DEVICE — Device: Brand: OMNIWIRE PRESSURE GUIDE WIRE

## (undated) DEVICE — WIPE INST MEROCEL

## (undated) DEVICE — NC TREK NEO™ CORONARY DILATATION CATHETER 2.00 MM X 20 MM / RAPID-EXCHANGE: Brand: NC TREK NEO™

## (undated) DEVICE — CROUCH CORNEAL PROTECTOR: Brand: BAUSCH + LOMB

## (undated) DEVICE — GOWN,NON-REINFORCED,SIRUS,SET IN SLV,XL: Brand: MEDLINE

## (undated) DEVICE — PINNACLE INTRODUCER SHEATH: Brand: PINNACLE

## (undated) DEVICE — MARKR SKIN W/RULR ULTRAFINETP